# Patient Record
Sex: MALE | Race: WHITE | NOT HISPANIC OR LATINO | Employment: OTHER | URBAN - METROPOLITAN AREA
[De-identification: names, ages, dates, MRNs, and addresses within clinical notes are randomized per-mention and may not be internally consistent; named-entity substitution may affect disease eponyms.]

---

## 2022-07-18 ENCOUNTER — OFFICE VISIT (OUTPATIENT)
Dept: INTERNAL MEDICINE | Facility: CLINIC | Age: 82
End: 2022-07-18

## 2022-07-18 VITALS
HEART RATE: 63 BPM | HEIGHT: 73 IN | WEIGHT: 190.5 LBS | OXYGEN SATURATION: 98 % | DIASTOLIC BLOOD PRESSURE: 74 MMHG | BODY MASS INDEX: 25.25 KG/M2 | RESPIRATION RATE: 18 BRPM | SYSTOLIC BLOOD PRESSURE: 126 MMHG

## 2022-07-18 DIAGNOSIS — I79.0 ANEURYSM OF AORTA IN DISEASES CLASSIFIED ELSEWHERE: ICD-10-CM

## 2022-07-18 DIAGNOSIS — I71.9 AORTIC ANEURYSM WITHOUT RUPTURE, UNSPECIFIED PORTION OF AORTA: Primary | ICD-10-CM

## 2022-07-18 DIAGNOSIS — I48.91 ATRIAL FIBRILLATION, UNSPECIFIED TYPE: ICD-10-CM

## 2022-07-18 PROCEDURE — 99204 OFFICE O/P NEW MOD 45 MIN: CPT | Performed by: FAMILY MEDICINE

## 2022-07-18 RX ORDER — TRAZODONE HYDROCHLORIDE 100 MG/1
100 TABLET ORAL NIGHTLY
Qty: 90 TABLET | Refills: 3 | Status: SHIPPED | OUTPATIENT
Start: 2022-07-18

## 2022-07-18 RX ORDER — AMIODARONE HYDROCHLORIDE 200 MG/1
100 TABLET ORAL DAILY
COMMUNITY
End: 2022-07-18

## 2022-07-18 RX ORDER — MONTELUKAST SODIUM 10 MG/1
10 TABLET ORAL NIGHTLY
Qty: 90 TABLET | Refills: 3 | Status: SHIPPED | OUTPATIENT
Start: 2022-07-18

## 2022-07-18 RX ORDER — MONTELUKAST SODIUM 10 MG/1
TABLET ORAL
COMMUNITY
Start: 2022-07-11 | End: 2022-07-18 | Stop reason: SDUPTHER

## 2022-07-18 RX ORDER — SPIRONOLACTONE 50 MG/1
TABLET, FILM COATED ORAL
COMMUNITY
Start: 2022-07-11 | End: 2022-07-18 | Stop reason: SDUPTHER

## 2022-07-18 RX ORDER — SPIRONOLACTONE 50 MG/1
50 TABLET, FILM COATED ORAL DAILY
Qty: 90 TABLET | Refills: 3 | Status: SHIPPED | OUTPATIENT
Start: 2022-07-18

## 2022-07-18 RX ORDER — AMIODARONE HYDROCHLORIDE 100 MG/1
100 TABLET ORAL DAILY
Qty: 90 TABLET | Refills: 3 | Status: SHIPPED | OUTPATIENT
Start: 2022-07-18 | End: 2022-10-17

## 2022-07-18 RX ORDER — TRAZODONE HYDROCHLORIDE 100 MG/1
TABLET ORAL
COMMUNITY
Start: 2022-07-11 | End: 2022-07-18 | Stop reason: SDUPTHER

## 2022-07-24 NOTE — PROGRESS NOTES
"Chief Complaint  Establish Care    Subjective        Dat Menjivar presents to [facility]  History of Present Illness    Patient presents at today's office visit as a new patient.  Patient states that he has a history of having aortic aneurysm, but cannot give further details exactly where in the aorta this can be.  Normal I able to find and medical records of this.  He states that he is looking to see a cardiologist that he can see for this.  He currently lives in [place] and visits [place] every 3 months for 2 weeks at a time.  And would like to see his cardiologist at that time.  He also states that he typically gets an echocardiogram done as well as an ultrasound of the aorta done regularly for this, I did discuss at today's of visit that we can order these.    He does have a history having A. fib, and is currently taking Xarelto 20 mg daily for this along with amiodarone 100 mg daily.    Objective   Vital Signs:  /74   Pulse 63   Resp 18   Ht 185.4 cm (73\")   Wt 86.4 kg (190 lb 8 oz)   SpO2 98%   BMI 25.13 kg/m²   Estimated body mass index is 25.13 kg/m² as calculated from the following:    Height as of this encounter: 185.4 cm (73\").    Weight as of this encounter: 86.4 kg (190 lb 8 oz).          Physical Exam  Vitals and nursing note reviewed.   Constitutional:       Appearance: He is well-developed.   HENT:      Head: Normocephalic and atraumatic.   Musculoskeletal:      Cervical back: Normal range of motion and neck supple.   Neurological:      Mental Status: He is alert and oriented to person, place, and time.   Psychiatric:         Behavior: Behavior normal.        Result Review :                Assessment and Plan   Diagnoses and all orders for this visit:    1. Aortic aneurysm without rupture, unspecified portion of aorta (HCC) (Primary)  -     Ambulatory Referral to Cardiology  -     Adult Transthoracic Echo Complete W/ Cont if Necessary Per " Protocol  -     US aorta limited    2. Atrial fibrillation, unspecified type (HCC)  -     Adult Transthoracic Echo Complete W/ Cont if Necessary Per Protocol    3. Aneurysm of aorta in diseases classified elsewhere (HCC)   -     Adult Transthoracic Echo Complete W/ Cont if Necessary Per Protocol    Other orders  -     rivaroxaban (XARELTO) 20 MG tablet; Take 1 tablet by mouth Daily.  Dispense: 90 tablet; Refill: 3  -     amiodarone (Pacerone) 100 MG tablet; Take 1 tablet by mouth Daily.  Dispense: 90 tablet; Refill: 3  -     traZODone (DESYREL) 100 MG tablet; Take 1 tablet by mouth Every Night.  Dispense: 90 tablet; Refill: 3  -     spironolactone (ALDACTONE) 50 MG tablet; Take 1 tablet by mouth Daily.  Dispense: 90 tablet; Refill: 3  -     montelukast (SINGULAIR) 10 MG tablet; Take 1 tablet by mouth Every Night.  Dispense: 90 tablet; Refill: 3      For his A. fib, continue Xarelto and amiodarone.  For his aortic aneurysm, we will get an ultrasound of the aorta as well as referral to cardiology.  As well as an echocardiogram.       Follow Up   No follow-ups on file.  Patient was given instructions and counseling regarding his condition or for health maintenance advice. Please see specific information pulled into the AVS if appropriate.

## 2022-07-28 ENCOUNTER — PATIENT ROUNDING (BHMG ONLY) (OUTPATIENT)
Dept: INTERNAL MEDICINE | Facility: CLINIC | Age: 82
End: 2022-07-28

## 2022-07-28 NOTE — PROGRESS NOTES
July 28, 2022    Hello, may I speak with Dat Menjivar?    My name is SONIA  I am  with [facility]  [address]  [place] [address] [phone].    Before we get started may I verify your date of birth? 1940    I am calling to officially welcome you to our practice and ask about your recent visit. Is this a good time to talk? YES  Tell me about your visit with us. What things went well? IT WAS GOOD       We're always looking for ways to make our patients' experiences even better. Do you have recommendations on ways we may improve?  NO   Overall were you satisfied with your first visit to our practice? YES     I appreciate you taking the time to speak with me today. Is there anything else I can do for you? NO    Thank you, and have a great day.

## 2022-10-17 RX ORDER — AMIODARONE HYDROCHLORIDE 100 MG/1
100 TABLET ORAL DAILY
Qty: 90 TABLET | Refills: 0 | Status: SHIPPED | OUTPATIENT
Start: 2022-10-17

## 2023-01-05 ENCOUNTER — TELEPHONE (OUTPATIENT)
Dept: INTERNAL MEDICINE | Facility: CLINIC | Age: 83
End: 2023-01-05

## 2023-01-05 NOTE — TELEPHONE ENCOUNTER
Caller: Dat Menjivar    Relationship: Self    Best call back number: [phone]    What is the medical concern/diagnosis: SINUS ISSUES     What specialty or service is being requested: EAR NOSE AND THROAT    What is the provider, practice or medical service name: WHOEVER IS RECOMMENDED.     What is the office location: [place]    PLEASE REACH SON TO SCHEDULE [phone] AS DAT LIVES IN [facility] AND WILL NOT BE IN THE [facility] UNTIL January 19TH-24TH

## 2023-01-20 ENCOUNTER — OFFICE VISIT (OUTPATIENT)
Dept: INTERNAL MEDICINE | Facility: CLINIC | Age: 83
End: 2023-01-20
Payer: MEDICARE

## 2023-01-20 VITALS
OXYGEN SATURATION: 98 % | HEIGHT: 73 IN | BODY MASS INDEX: 25.84 KG/M2 | DIASTOLIC BLOOD PRESSURE: 82 MMHG | WEIGHT: 195 LBS | HEART RATE: 64 BPM | SYSTOLIC BLOOD PRESSURE: 116 MMHG

## 2023-01-20 DIAGNOSIS — H69.83 EUSTACHIAN TUBE DYSFUNCTION, BILATERAL: ICD-10-CM

## 2023-01-20 DIAGNOSIS — H91.93 DECREASED HEARING OF BOTH EARS: Primary | ICD-10-CM

## 2023-01-20 PROCEDURE — 99214 OFFICE O/P EST MOD 30 MIN: CPT | Performed by: FAMILY MEDICINE

## 2023-01-20 RX ORDER — FLUTICASONE PROPIONATE 50 MCG
2 SPRAY, SUSPENSION (ML) NASAL DAILY
Qty: 18 ML | Refills: 6 | Status: SHIPPED | OUTPATIENT
Start: 2023-01-20

## 2023-01-22 NOTE — PROGRESS NOTES
"Chief Complaint  ent referral    Subjective        Dat Menjivar presents to [facility]  History of Present Illness    Patient has some what he believes may be eustachian tube dysfunction.  Patient states that he has some trouble hearing.  However he feels that this pressure inside his sinuses.  He is currently using Singulair along with proximal over-the-counter allergy medication.  He has not tried a nasal steroid.  He has used saline rinses.    Objective   Vital Signs:  /82 (BP Location: Left arm, Patient Position: Sitting, Cuff Size: Adult)   Pulse 64   Ht 185 cm (72.84\")   Wt 88.5 kg (195 lb)   SpO2 98%   BMI 25.84 kg/m²   Estimated body mass index is 25.84 kg/m² as calculated from the following:    Height as of this encounter: 185 cm (72.84\").    Weight as of this encounter: 88.5 kg (195 lb).             Physical Exam  Vitals and nursing note reviewed.   Constitutional:       Appearance: He is well-developed.   HENT:      Head: Normocephalic and atraumatic.   Musculoskeletal:      Cervical back: Normal range of motion and neck supple.   Neurological:      Mental Status: He is alert and oriented to person, place, and time.   Psychiatric:         Behavior: Behavior normal.        Result Review :                   Assessment and Plan   Diagnoses and all orders for this visit:    1. Decreased hearing of both ears (Primary)  -     Ambulatory Referral to Audiology    2. Eustachian tube dysfunction, bilateral  -     fluticasone (FLONASE) 50 MCG/ACT nasal spray; 2 sprays into the nostril(s) as directed by provider Daily.  Dispense: 18 mL; Refill: 6  -     Ambulatory Referral to ENT (Otolaryngology)      I discussed with him that he could benefit from seeing ENT.  I do recommend him starting Flonase.  He may also benefit from seeing audiology for hearing testing.       Follow Up   No follow-ups on file.  Patient was given instructions and counseling regarding his condition " or for health maintenance advice. Please see specific information pulled into the AVS if appropriate.

## 2023-06-06 ENCOUNTER — TELEPHONE (OUTPATIENT)
Dept: INTERNAL MEDICINE | Facility: CLINIC | Age: 83
End: 2023-06-06

## 2023-06-06 NOTE — TELEPHONE ENCOUNTER
Caller: Dat Menjivar    Relationship: Self    Best call back number: [phone]     What is the medical concern/diagnosis: PATIENT WILLL BE IN FROM [facility] ON 7-9-23 AND LEAVING 7-16-23    HE WANTS TO BE REFERRED TO CARDIOLOGY   HE WANTS TO BE REFERRED TO UROLOGY           Any additional details: HE WANTS TO HAVE THE APPOINTS WITH THESE SPECIALTY CLINICS WHILE HE IS IN BETWEEN JULY 9 AND JULY 16.    HE HAS A PRIMARY CARE APPOINTMENT SCHEDULED FOR  JULY 11TH.    IF HE NEEDS LABS PRIOR TO THIS APPOINTMENT PLEASE CALL TO [facility] AND LEAVE MESSAGE ON ABOVE PHONE WHICH IS THE SONS NUMBER.

## 2023-06-07 DIAGNOSIS — R31.9 HEMATURIA, UNSPECIFIED TYPE: ICD-10-CM

## 2023-06-07 DIAGNOSIS — I71.9 AORTIC ANEURYSM WITHOUT RUPTURE, UNSPECIFIED PORTION OF AORTA: Primary | ICD-10-CM

## 2023-06-07 DIAGNOSIS — R94.31 ABNORMAL ELECTROCARDIOGRAM (ECG) (EKG): ICD-10-CM

## 2023-06-07 NOTE — TELEPHONE ENCOUNTER
Pt was referred to cardiology in 2022 but refused service.  I need a call back to ask the reason he needs to see urology.  LVM.

## 2023-06-07 NOTE — TELEPHONE ENCOUNTER
Caller: Dat Menjivar    Relationship: Self    Best call back number: [phone]    What was the call regarding: PATIENT STATES THAT THE REFERRAL FOR A CARDIOLOGIST IS NEEDED BECAUSE HE NEEDS AN ULTRASOUND OF HIS HEART.    REFERRAL FOR UROLOGY IS NEEDED BECAUSE EVERY 4-5 MONTHS HE NOTICES SOME BLOOD IN HIS URINE.

## 2023-07-11 ENCOUNTER — OFFICE VISIT (OUTPATIENT)
Dept: INTERNAL MEDICINE | Facility: CLINIC | Age: 83
End: 2023-07-11
Payer: MEDICARE

## 2023-07-11 VITALS
HEART RATE: 92 BPM | RESPIRATION RATE: 16 BRPM | WEIGHT: 193 LBS | OXYGEN SATURATION: 96 % | SYSTOLIC BLOOD PRESSURE: 98 MMHG | BODY MASS INDEX: 25.58 KG/M2 | DIASTOLIC BLOOD PRESSURE: 66 MMHG | HEIGHT: 73 IN

## 2023-07-11 DIAGNOSIS — F51.01 PRIMARY INSOMNIA: ICD-10-CM

## 2023-07-11 DIAGNOSIS — Z00.00 HEALTHCARE MAINTENANCE: ICD-10-CM

## 2023-07-11 DIAGNOSIS — Z00.00 MEDICARE ANNUAL WELLNESS VISIT, INITIAL: ICD-10-CM

## 2023-07-11 DIAGNOSIS — Z00.00 VISIT FOR WELL MAN HEALTH CHECK: Primary | ICD-10-CM

## 2023-07-11 DIAGNOSIS — J30.9 ALLERGIC RHINITIS, UNSPECIFIED SEASONALITY, UNSPECIFIED TRIGGER: ICD-10-CM

## 2023-07-11 RX ORDER — MONTELUKAST SODIUM 10 MG/1
10 TABLET ORAL NIGHTLY
Qty: 90 TABLET | Refills: 1 | Status: SHIPPED | OUTPATIENT
Start: 2023-07-11

## 2023-07-11 RX ORDER — ZOLPIDEM TARTRATE 10 MG/1
10 TABLET ORAL NIGHTLY PRN
Qty: 90 TABLET | Refills: 0 | Status: SHIPPED | OUTPATIENT
Start: 2023-07-11

## 2023-07-11 NOTE — PROGRESS NOTES
The ABCs of the Annual Wellness Visit  Initial Medicare Wellness Visit    Subjective     Dat Menjivar is a 83 y.o. male who presents for an Initial Medicare Wellness Visit.    The following portions of the patient's history were reviewed and   updated as appropriate: allergies, current medications, past family history, past medical history, past social history, past surgical history, and problem list.     Compared to one year ago, the patient feels his physical   health is better.    Compared to one year ago, the patient feels his mental   health is better.    Recent Hospitalizations:  He was not admitted to the hospital during the last year.       Current Medical Providers:  Patient Care Team:  Flaco Wylie MD as PCP - General (Family Medicine)    Outpatient Medications Prior to Visit   Medication Sig Dispense Refill    traZODone (DESYREL) 100 MG tablet Take 1 tablet by mouth Every Night. 90 tablet 3    amiodarone (PACERONE) 100 MG tablet TAKE 1 TABLET BY MOUTH DAILY 90 tablet 0    fluticasone (FLONASE) 50 MCG/ACT nasal spray 2 sprays into the nostril(s) as directed by provider Daily. (Patient not taking: Reported on 7/14/2023) 18 mL 6    montelukast (SINGULAIR) 10 MG tablet Take 1 tablet by mouth Every Night. 90 tablet 3    rivaroxaban (XARELTO) 20 MG tablet Take 1 tablet by mouth Daily. 90 tablet 3    spironolactone (ALDACTONE) 50 MG tablet Take 1 tablet by mouth Daily. 90 tablet 3     No facility-administered medications prior to visit.       No opioid medication identified on active medication list. I have reviewed chart for other potential  high risk medication/s and harmful drug interactions in the elderly.        Aspirin is not on active medication list.  Aspirin use is not indicated based on review of current medical condition/s. Risk of harm outweighs potential benefits.  .    There is no problem list on file for this patient.    Advance Care Planning   Advance Care Planning     Advance Directive  "is not on file.  ACP discussion was held with the patient during this visit. Patient does not have an advance directive, declines further assistance.       Objective    Vitals:    23 1242   BP: 98/66   BP Location: Left arm   Patient Position: Sitting   Cuff Size: Adult   Pulse: 92   Resp: 16   SpO2: 96%   Weight: 87.5 kg (193 lb)   Height: 185 cm (72.84\")     Estimated body mass index is 25.58 kg/mý as calculated from the following:    Height as of this encounter: 185 cm (72.84\").    Weight as of this encounter: 87.5 kg (193 lb).    BMI is >= 25 and <30. (Overweight) The following options were offered after discussion;: exercise counseling/recommendations and nutrition counseling/recommendations      Does the patient have evidence of cognitive impairment?   No    Lab Results   Component Value Date    TRIG 73 2023    HDL 46 2023    LDL 99 2023    VLDL 14 2023    HGBA1C 5.40 2023        HEALTH RISK ASSESSMENT    Smoking Status:  Social History     Tobacco Use   Smoking Status Former    Passive exposure: Past   Smokeless Tobacco Never   Tobacco Comments    Caffeine: 2 coffee daily     Alcohol Consumption:  Social History     Substance and Sexual Activity   Alcohol Use Not Currently    Alcohol/week: 8.0 - 9.0 standard drinks    Types: 3 Glasses of wine, 3 Shots of liquor, 2 - 3 Drinks containing 0.5 oz of alcohol per week    Comment: occ     Fall Risk Screen:    FLORENTINO Fall Risk Assessment was completed, and patient is at LOW risk for falls.Assessment completed on:2023    Depression Screen:       2023    12:46 PM   PHQ-2/PHQ-9 Depression Screening   Little Interest or Pleasure in Doing Things 0-->not at all   Feeling Down, Depressed or Hopeless 0-->not at all   PHQ-9: Brief Depression Severity Measure Score 0       Health Habits and Functional and Cognitive Screenin/11/2023    12:00 PM   Functional & Cognitive Status   Do you have difficulty preparing food and " eating? No   Do you have difficulty bathing yourself, getting dressed or grooming yourself? No   Do you have difficulty using the toilet? No   Do you have difficulty moving around from place to place? No   Do you have trouble with steps or getting out of a bed or a chair? No   Current Diet Well Balanced Diet   Dental Exam Up to date   Eye Exam Up to date   Exercise (times per week) 5 times per week   Current Exercises Include Walking   Do you need help using the phone?  No   Are you deaf or do you have serious difficulty hearing?  No   Do you need help to go to places out of walking distance? No   Do you need help shopping? No   Do you need help preparing meals?  No   Do you need help with housework?  No   Do you need help with laundry? No   Do you need help taking your medications? No   Do you need help managing money? No   Do you ever drive or ride in a car without wearing a seat belt? No   Have you felt unusual stress, anger or loneliness in the last month? No   Who do you live with? Alone   If you need help, do you have trouble finding someone available to you? No   Have you been bothered in the last four weeks by sexual problems? No   Do you have difficulty concentrating, remembering or making decisions? No       Age-appropriate Screening Schedule:  Refer to the list below for future screening recommendations based on patient's age, sex and/or medical conditions. Orders for these recommended tests are listed in the plan section. The patient has been provided with a written plan.    Health Maintenance   Topic Date Due    TDAP/TD VACCINES (1 - Tdap) Never done    ZOSTER VACCINE (1 of 2) Never done    Pneumococcal Vaccine 65+ (1 - PCV) Never done    COVID-19 Vaccine (2 - Pfizer series) 03/12/2022    ANNUAL WELLNESS VISIT  Never done    INFLUENZA VACCINE  10/01/2023          CMS Preventative Services Quick Reference  Risk Factors Identified During Encounter    None Identified    The above risks/problems have been  discussed with the patient.  Pertinent information has been shared with the patient in the After Visit Summary.  An After Visit Summary and PPPS were made available to the patient.  Diagnoses and all orders for this visit:    1. Visit for well man health check (Primary)    2. Medicare annual wellness visit, initial    3. Healthcare maintenance  -     CBC & Differential  -     Comprehensive Metabolic Panel  -     Hemoglobin A1c  -     Thyroid Panel With TSH  -     Lipid Panel With LDL / HDL Ratio    4. Allergic rhinitis, unspecified seasonality, unspecified trigger  -     montelukast (Singulair) 10 MG tablet; Take 1 tablet by mouth Every Night.  Dispense: 90 tablet; Refill: 1    5. Primary insomnia  -     zolpidem (AMBIEN) 10 MG tablet; Take 1 tablet by mouth At Night As Needed for Sleep.  Dispense: 90 tablet; Refill: 0      Follow Up:  Next Medicare Wellness visit to be scheduled in 1 year.

## 2023-07-13 ENCOUNTER — TELEPHONE (OUTPATIENT)
Dept: INTERNAL MEDICINE | Facility: CLINIC | Age: 83
End: 2023-07-13
Payer: MEDICARE

## 2023-07-13 NOTE — TELEPHONE ENCOUNTER
Caller: Dat Menjivar    Relationship: Self    Best call back number: [phone]    What orders are you requesting (i.e. lab or imaging): PSA, TESTOSTERONE, 12 PANEL BLOOD DRAW OR WHATEVER DR. ORLANDO DEEMS NECESSARY. PATIENT HAS NOT HAD LEVELS CHECKED IN A WHILE     In what timeframe would the patient need to come in: 07/14 OR 07/15    Where will you receive your lab/imaging services: [facility]     Additional notes: PATIENT STATES THAT HE IS LEAVING SUNDAY FOR [facility]. PATIENT HAS AN APPOINTMENT TOMORROW AT 9:30 WITH HIS CARDIOLOGIST. PATIENT WOULD BE GOOD TO COME IN FROM 10:30     PLEASE CALL PATIENT.

## 2023-07-14 LAB
ALBUMIN SERPL-MCNC: 4 G/DL (ref 3.5–5.2)
ALBUMIN/GLOB SERPL: 1.7 G/DL
ALP SERPL-CCNC: 55 U/L (ref 39–117)
ALT SERPL W P-5'-P-CCNC: 18 U/L (ref 1–41)
ANION GAP SERPL CALCULATED.3IONS-SCNC: 7 MMOL/L (ref 5–15)
AST SERPL-CCNC: 24 U/L (ref 1–40)
BASOPHILS # BLD AUTO: 0.04 10*3/MM3 (ref 0–0.2)
BASOPHILS NFR BLD AUTO: 0.6 % (ref 0–1.5)
BILIRUB SERPL-MCNC: 0.6 MG/DL (ref 0–1.2)
BUN SERPL-MCNC: 22 MG/DL (ref 8–23)
BUN/CREAT SERPL: 13.1 (ref 7–25)
CALCIUM SPEC-SCNC: 9.2 MG/DL (ref 8.6–10.5)
CHLORIDE SERPL-SCNC: 111 MMOL/L (ref 98–107)
CHOLEST SERPL-MCNC: 159 MG/DL (ref 0–200)
CO2 SERPL-SCNC: 24 MMOL/L (ref 22–29)
CREAT SERPL-MCNC: 1.68 MG/DL (ref 0.76–1.27)
DEPRECATED RDW RBC AUTO: 41.9 FL (ref 37–54)
EGFRCR SERPLBLD CKD-EPI 2021: 40.6 ML/MIN/1.73
EOSINOPHIL # BLD AUTO: 0.19 10*3/MM3 (ref 0–0.4)
EOSINOPHIL NFR BLD AUTO: 2.9 % (ref 0.3–6.2)
ERYTHROCYTE [DISTWIDTH] IN BLOOD BY AUTOMATED COUNT: 11.8 % (ref 12.3–15.4)
GLOBULIN UR ELPH-MCNC: 2.4 GM/DL
GLUCOSE SERPL-MCNC: 101 MG/DL (ref 65–99)
HBA1C MFR BLD: 5.4 % (ref 4.8–5.6)
HCT VFR BLD AUTO: 52.7 % (ref 37.5–51)
HDLC SERPL-MCNC: 46 MG/DL (ref 40–60)
HGB BLD-MCNC: 17.7 G/DL (ref 13–17.7)
IMM GRANULOCYTES # BLD AUTO: 0.03 10*3/MM3 (ref 0–0.05)
IMM GRANULOCYTES NFR BLD AUTO: 0.5 % (ref 0–0.5)
LDLC SERPL CALC-MCNC: 99 MG/DL (ref 0–100)
LDLC/HDLC SERPL: 2.14 {RATIO}
LYMPHOCYTES # BLD AUTO: 1.45 10*3/MM3 (ref 0.7–3.1)
LYMPHOCYTES NFR BLD AUTO: 22.4 % (ref 19.6–45.3)
MCH RBC QN AUTO: 32.3 PG (ref 26.6–33)
MCHC RBC AUTO-ENTMCNC: 33.6 G/DL (ref 31.5–35.7)
MCV RBC AUTO: 96.2 FL (ref 79–97)
MONOCYTES # BLD AUTO: 0.49 10*3/MM3 (ref 0.1–0.9)
MONOCYTES NFR BLD AUTO: 7.6 % (ref 5–12)
NEUTROPHILS NFR BLD AUTO: 4.27 10*3/MM3 (ref 1.7–7)
NEUTROPHILS NFR BLD AUTO: 66 % (ref 42.7–76)
NRBC BLD AUTO-RTO: 0 /100 WBC (ref 0–0.2)
PLATELET # BLD AUTO: 148 10*3/MM3 (ref 140–450)
PMV BLD AUTO: 10 FL (ref 6–12)
POTASSIUM SERPL-SCNC: 4.6 MMOL/L (ref 3.5–5.2)
PROT SERPL-MCNC: 6.4 G/DL (ref 6–8.5)
RBC # BLD AUTO: 5.48 10*6/MM3 (ref 4.14–5.8)
SODIUM SERPL-SCNC: 142 MMOL/L (ref 136–145)
T-UPTAKE NFR SERPL: 0.89 TBI (ref 0.8–1.3)
T4 SERPL-MCNC: 6.17 MCG/DL (ref 4.5–11.7)
TRIGL SERPL-MCNC: 73 MG/DL (ref 0–150)
TSH SERPL DL<=0.05 MIU/L-ACNC: 1.56 UIU/ML (ref 0.27–4.2)
VLDLC SERPL-MCNC: 14 MG/DL (ref 5–40)
WBC NRBC COR # BLD: 6.47 10*3/MM3 (ref 3.4–10.8)

## 2023-07-14 PROCEDURE — 36415 COLL VENOUS BLD VENIPUNCTURE: CPT | Performed by: FAMILY MEDICINE

## 2023-07-14 NOTE — TELEPHONE ENCOUNTER
PATIENT CALLING BACK AND REQUESTING TO HAVE HIS LABS DRAWN TODAY.    PATIENT WOULD LIKE TO HAVE THE PSA AND TESTOSTERONE ADDED TO HAVE DRAWN TODAY AS WELL.  PATIENT IS PLANNING ON HAVING IT DONE AROUND 10:30 THIS MORNING.

## 2023-07-19 NOTE — PROGRESS NOTES
Please inform the patient of the following abnormal results. Elevated serum creatnine. Needs to drink of plenty of water. Will recheck at next visit. [place] of country, but comes every 4 mos.

## 2023-07-21 ENCOUNTER — TELEPHONE (OUTPATIENT)
Dept: INTERNAL MEDICINE | Facility: CLINIC | Age: 83
End: 2023-07-21

## 2023-07-21 NOTE — TELEPHONE ENCOUNTER
[facility] staff attempted to follow warm transfer process and was unsuccessful     Caller: Dat Menjivar    Relationship to patient: Self    Best call back number:     Patient is needing: PATIENT IS RETURNING A CALL TO GIOVANNA AND STATES HE ALSO NEEDS TO SPEAK WITH SONIA.   PATIENT WANTS TO TELL GIOVANNA ABOUT SEEING DR. LEUNG AND SOME THINGS RELATED TO THAT, AS WELL AS GETTING SOME PROCEDURES DONE IN [place] [facility] THAN IN [facility].        PATIENT ALSO WANTS A REFERRAL TO DERMATOLOGY.       [facility] WAS UNABLE TO WARM TRANSFER THE CALL.

## 2023-07-21 NOTE — TELEPHONE ENCOUNTER
"Pt states he will be in town from [place] on Monday and requesting a referral for dermatology \"due to several small skin cancers from living in [place]\". He said he will only be in town for the week for medical appts.     Please advise, thanks.    Thanks-  Vahe"

## 2023-07-23 NOTE — TELEPHONE ENCOUNTER
Its to late to get the labs added on. I have ordered it to be done next time he is getting labs done.

## 2023-07-23 NOTE — TELEPHONE ENCOUNTER
Can we have these referrals put in. I believe he was already here for a week. Is this another week he is coming in for?

## 2023-07-25 NOTE — TELEPHONE ENCOUNTER
He called last Friday and requested this, and then I sent you the message. He stated that he will be in town this week.

## 2023-07-28 ENCOUNTER — HOSPITAL ENCOUNTER (OUTPATIENT)
Dept: CT IMAGING | Facility: HOSPITAL | Age: 83
Discharge: HOME | End: 2023-07-28
Admitting: INTERNAL MEDICINE
Payer: MEDICARE

## 2023-07-28 LAB — CREAT BLDA-MCNC: 1.6 MG/DL (ref 0.6–1.3)

## 2023-07-28 PROCEDURE — 25510000001 IOPAMIDOL PER 1 ML: Performed by: INTERNAL MEDICINE

## 2023-07-28 PROCEDURE — 82565 ASSAY OF CREATININE: CPT

## 2023-07-28 PROCEDURE — 71275 CT ANGIOGRAPHY CHEST: CPT

## 2023-07-28 RX ADMIN — IOPAMIDOL 95 ML: 755 INJECTION, SOLUTION INTRAVENOUS at 12:42

## 2023-08-02 RX ORDER — METOPROLOL SUCCINATE 25 MG/1
25 TABLET, EXTENDED RELEASE ORAL DAILY
Qty: 90 TABLET | Refills: 3 | Status: SHIPPED | OUTPATIENT
Start: 2023-08-02

## 2023-08-12 NOTE — PROGRESS NOTES
Subjective   Dat Menjivar is a 83 y.o. male and is here for a comprehensive physical exam. The patient reports no problems.            Social History:   Social History     Socioeconomic History    Marital status: Single    Number of children: 1   Tobacco Use    Smoking status: Former     Passive exposure: Past    Smokeless tobacco: Never    Tobacco comments:     Caffeine: 2 coffee daily   Vaping Use    Vaping Use: Never used   Substance and Sexual Activity    Alcohol use: Not Currently     Alcohol/week: 8.0 - 9.0 standard drinks     Types: 3 Glasses of wine, 3 Shots of liquor, 2 - 3 Drinks containing 0.5 oz of alcohol per week     Comment: occ    Drug use: Not Currently    Sexual activity: Not Currently     Partners: Female       Family History:   Family History   Problem Relation Age of Onset    Diabetes Sister        Past Medical History:   Past Medical History:   Diagnosis Date    Clotting disorder     Depression     HL (hearing loss)        The following portions of the patient's history were reviewed and updated as appropriate: allergies, current medications, past family history, past medical history, past social history, past surgical history and problem list.    Review of Systems    Review of Systems   Constitutional:  Negative for chills and fever.   HENT:  Negative for congestion, rhinorrhea, sinus pain and sore throat.    Eyes:  Negative for photophobia and visual disturbance.   Respiratory:  Negative for cough, chest tightness and shortness of breath.    Cardiovascular:  Negative for chest pain and palpitations.   Gastrointestinal:  Negative for diarrhea, nausea and vomiting.   Genitourinary:  Negative for dysuria, frequency and urgency.   Skin:  Negative for rash and wound.   Neurological:  Negative for dizziness and syncope.   Psychiatric/Behavioral:  Negative for behavioral problems and confusion.      Objective   Physical Exam  Vitals and nursing note reviewed.   Constitutional:       Appearance:  He is well-developed.   HENT:      Head: Normocephalic and atraumatic.      Right Ear: External ear normal.      Left Ear: External ear normal.   Cardiovascular:      Rate and Rhythm: Normal rate and regular rhythm.      Heart sounds: Normal heart sounds.   Pulmonary:      Effort: Pulmonary effort is normal. No respiratory distress.      Breath sounds: Normal breath sounds.   Abdominal:      Palpations: Abdomen is soft.      Tenderness: There is no abdominal tenderness. There is no guarding.   Musculoskeletal:         General: Normal range of motion.      Cervical back: Normal range of motion and neck supple.   Lymphadenopathy:      Cervical: No cervical adenopathy.   Skin:     General: Skin is warm.   Neurological:      Mental Status: He is alert and oriented to person, place, and time.   Psychiatric:         Behavior: Behavior normal.       Vitals:    07/11/23 1242   BP: 98/66   Pulse: 92   Resp: 16   SpO2: 96%     Body mass index is 25.58 kg/mý.    Medications:   Current Outpatient Medications:     traZODone (DESYREL) 100 MG tablet, Take 1 tablet by mouth Every Night., Disp: 90 tablet, Rfl: 3    metoprolol succinate XL (TOPROL-XL) 25 MG 24 hr tablet, Take 1 tablet by mouth Daily., Disp: 90 tablet, Rfl: 3    montelukast (Singulair) 10 MG tablet, Take 1 tablet by mouth Every Night., Disp: 90 tablet, Rfl: 1    rivaroxaban (XARELTO) 20 MG tablet, Take 1 tablet by mouth Daily., Disp: 90 tablet, Rfl: 1    spironolactone (ALDACTONE) 50 MG tablet, Take 1 tablet by mouth Daily., Disp: 90 tablet, Rfl: 3    zolpidem (AMBIEN) 10 MG tablet, Take 1 tablet by mouth At Night As Needed for Sleep., Disp: 90 tablet, Rfl: 0       Assessment & Plan   Healthy male exam.      1. Healthcare Maintenance:  2. Patient Counseling:  --Nutrition: Stressed importance of moderation in sodium/caffeine intake, saturated fat and cholesterol, caloric balance, sufficient intake of fresh fruits, vegetables, fiber, calcium and vit D  --Exercise:  Recommended 30 minutes of exercise daily.   --Immunizations reviewed.  --Discussed benefits of screening colonoscopy.    Diagnoses and all orders for this visit:    Visit for well man health check    Medicare annual wellness visit, initial    Healthcare maintenance  -     CBC & Differential  -     Comprehensive Metabolic Panel  -     Hemoglobin A1c  -     Thyroid Panel With TSH  -     Lipid Panel With LDL / HDL Ratio    Allergic rhinitis, unspecified seasonality, unspecified trigger  -     montelukast (Singulair) 10 MG tablet; Take 1 tablet by mouth Every Night.    Primary insomnia  -     zolpidem (AMBIEN) 10 MG tablet; Take 1 tablet by mouth At Night As Needed for Sleep.        No follow-ups on file.           Dictated utilizing Dragon Voice Recognition Software

## 2023-08-12 NOTE — PROGRESS NOTES
"Chief Complaint  Medicare Wellness-subsequent    Subjective        Dat Menjivar presents to [facility]  History of Present Illness    Patient is a history of having some allergic rhinitis.  Patient states that he would like to try Singulair.  He has been taking Singulair, but has run out of it.  Patient would like to get back on his Singulair 10 mg daily.    Patient states that he has some trouble sleeping.  Patient states that he would like to try some medicine to help him sleep better.    Objective   Vital Signs:  BP 98/66 (BP Location: Left arm, Patient Position: Sitting, Cuff Size: Adult)   Pulse 92   Resp 16   Ht 185 cm (72.84\")   Wt 87.5 kg (193 lb)   SpO2 96%   BMI 25.58 kg/mý   Estimated body mass index is 25.58 kg/mý as calculated from the following:    Height as of this encounter: 185 cm (72.84\").    Weight as of this encounter: 87.5 kg (193 lb).             Physical Exam   Result Review :                   Assessment and Plan   Diagnoses and all orders for this visit:            Healthcare maintenance  -     CBC & Differential  -     Comprehensive Metabolic Panel  -     Hemoglobin A1c  -     Thyroid Panel With TSH  -     Lipid Panel With LDL / HDL Ratio    Allergic rhinitis, unspecified seasonality, unspecified trigger  -     montelukast (Singulair) 10 MG tablet; Take 1 tablet by mouth Every Night.  Dispense: 90 tablet; Refill: 1    Primary insomnia  -     zolpidem (AMBIEN) 10 MG tablet; Take 1 tablet by mouth At Night As Needed for Sleep.  Dispense: 90 tablet; Refill: 0    For his allergic rhinitis, we will continue him on singular 10 mg daily.  I did advise him to use some Claritin and Flonase as well.  For his primary insomnia, we will start him on Ambien 10 mg nightly.         Follow Up   No follow-ups on file.  Patient was given instructions and counseling regarding his condition or for health maintenance advice. Please see specific information pulled into the " AVS if appropriate.

## 2023-08-14 DIAGNOSIS — E27.8 ADRENAL NODULE: ICD-10-CM

## 2023-08-14 DIAGNOSIS — R91.1 LUNG NODULE: Primary | ICD-10-CM

## 2023-09-05 RX ORDER — TRAZODONE HYDROCHLORIDE 100 MG/1
100 TABLET ORAL NIGHTLY
Qty: 90 TABLET | Refills: 0 | Status: SHIPPED | OUTPATIENT
Start: 2023-09-05

## 2023-09-05 RX ORDER — AMIODARONE HYDROCHLORIDE 100 MG/1
TABLET ORAL
Qty: 90 TABLET | Refills: 0 | Status: SHIPPED | OUTPATIENT
Start: 2023-09-05

## 2023-09-05 NOTE — TELEPHONE ENCOUNTER
Rx Refill Note  Requested Prescriptions     Pending Prescriptions Disp Refills    traZODone (DESYREL) 100 MG tablet [Pharmacy Med Name: TRAZODONE 100 MG TABLET] 90 tablet 0     Sig: TAKE 1 TABLET BY MOUTH EVERY NIGHT    amiodarone (PACERONE) 100 MG tablet [Pharmacy Med Name: AMIODARONE  MG TABLET] 90 tablet 0     Sig: TAKE 1 TABLET BY MOUTH DAILY      Last office visit with prescribing clinician: 7/11/2023   Last telemedicine visit with prescribing clinician: Visit date not found   Next office visit with prescribing clinician: Visit date not found                         Would you like a call back once the refill request has been completed: [] Yes [] No    If the office needs to give you a call back, can they leave a voicemail: [] Yes [] No    Hailee Jim MA  09/05/23, 09:23 EDT

## 2023-10-20 ENCOUNTER — TELEPHONE (OUTPATIENT)
Dept: INTERNAL MEDICINE | Facility: CLINIC | Age: 83
End: 2023-10-20

## 2023-10-20 NOTE — TELEPHONE ENCOUNTER
Caller: Dat Menjivar    Relationship to patient: Self    Best call back number:  THIS IS THE NUMBER FOR MAYTE, PATIENT'S SON AND THE PATIENT AUTHORIZES THE OFFICE TO LEAVE THIS INFORMATION WITH HIS SON.        Patient is needing: PATIENT IS NEEDING A REFERRAL FOR WHEN HE COMES BACK TO THE [facility] FROM [facility] THE WEEK OF NOVEMBER 13TH, 2023.        PATIENT STATES THAT HE IS NEEDING A REFERRAL TO AN ORTHOPEDIST THAT DR. ORLANDO RECOMMENDS HE SEE FROM A PREVIOUSLY BROKEN WRIST THE END OF JULY FOR WHICH HE DID NOT HAVE SURGERY ON.    HE IS NOW HAVING RESIDUAL PAIN IN HIS HAND/WRIST NOW THAT THE CAST HAS BEEN REMOVED.       PATIENT STATES HE IS ALSO NEEDING TO FOLLOW UP WITH A UROLOGIST TO HAVE A CYSTOSCOPY AND ULTRASOUND OF HIS BLADDER DUE TO HAVING BLOOD IN HIS URINE.   PATIENT BELIEVES HE WAS SEEN PREVIOUSLY BY DR. MARLYN ALEGRIA AT [facility].         PLEASE CONTACT THE PATIENT'S SON TO PROVIDE HIM REFERRAL INFORMATION DETAILS.

## 2023-10-27 ENCOUNTER — TELEPHONE (OUTPATIENT)
Dept: INTERNAL MEDICINE | Facility: CLINIC | Age: 83
End: 2023-10-27
Payer: MEDICARE

## 2023-10-27 DIAGNOSIS — H90.3 SENSORINEURAL HEARING LOSS (SNHL) OF BOTH EARS: ICD-10-CM

## 2023-10-27 DIAGNOSIS — M25.532 WRIST PAIN, CHRONIC, LEFT: Primary | ICD-10-CM

## 2023-10-27 DIAGNOSIS — G89.29 WRIST PAIN, CHRONIC, LEFT: Primary | ICD-10-CM

## 2023-10-27 NOTE — TELEPHONE ENCOUNTER
Patient lives in [place] and will be coming into town 11/11/23 to 11/17/23 and is requesting referrals be placed beforehand so he can make appointments to be seen while in town, he is requesting Audiology referral for bilateral hearing loss (he currently wears hearing aids), he also requests a Orthopedic surgery referral for wrist pain-he has imaging done in [facility]. Patient has an appointment with Dr. Wylie on 11/17/23.

## 2023-11-13 ENCOUNTER — OFFICE VISIT (OUTPATIENT)
Dept: SPORTS MEDICINE | Facility: CLINIC | Age: 83
End: 2023-11-13
Payer: MEDICARE

## 2023-11-13 ENCOUNTER — PATIENT ROUNDING (BHMG ONLY) (OUTPATIENT)
Dept: SPORTS MEDICINE | Facility: CLINIC | Age: 83
End: 2023-11-13
Payer: MEDICARE

## 2023-11-13 VITALS
OXYGEN SATURATION: 98 % | HEIGHT: 72 IN | WEIGHT: 185 LBS | SYSTOLIC BLOOD PRESSURE: 100 MMHG | TEMPERATURE: 97.1 F | RESPIRATION RATE: 12 BRPM | HEART RATE: 57 BPM | DIASTOLIC BLOOD PRESSURE: 60 MMHG | BODY MASS INDEX: 25.06 KG/M2

## 2023-11-13 DIAGNOSIS — S52.502G DELAYED UNION OF DISTAL RADIUS FRACTURE, LEFT, CLOSED: Primary | Chronic | ICD-10-CM

## 2023-11-13 RX ORDER — TAMSULOSIN HYDROCHLORIDE 0.4 MG/1
1 CAPSULE ORAL DAILY
COMMUNITY

## 2023-11-13 NOTE — PROGRESS NOTES
"Chief Complaint  Pain of the Left Wrist (After a fall in September-broke it-put in cast for 6 weeks and than took another set of x-rays.  Doctor advised surgery to put pins in it-still has swelling and limited motion)    Subjective        Dat Menjivar presents to [facility]  History of Present Illness  Patient referred to us by PCP Flaco Wylie MD for l wrist fx. patient lives in [facility], on August 27, 2023 he had a fall at home suffering intra-articular and angulated distal left radius fracture, ulnar styloid fracture.  Patient was told he needed surgery but because he was leaving [facility] did come to visit family here in [place] he decided to be seen here if surgery was needed.  Patient is only in [place] for the next week.  Placed in a cast [facility] but cast was removed couple of weeks ago and patient is still having pain and swelling over the dorsal aspect of the wrist, has significant pain and loss of strength with .  Objective   Vital Signs:  /60 (BP Location: Right arm, Patient Position: Sitting, Cuff Size: Adult)   Pulse 57   Temp 97.1 °F (36.2 °C) (Infrared)   Resp 12   Ht 182.9 cm (72\")   Wt 83.9 kg (185 lb)   SpO2 98%   BMI 25.09 kg/m²   Estimated body mass index is 25.09 kg/m² as calculated from the following:    Height as of this encounter: 182.9 cm (72\").    Weight as of this encounter: 83.9 kg (185 lb).               Physical Exam  Vitals reviewed.   Constitutional:       Appearance: He is well-developed.   HENT:      Head: Normocephalic and atraumatic.   Eyes:      Conjunctiva/sclera: Conjunctivae normal.      Pupils: Pupils are equal, round, and reactive to light.   Cardiovascular:      Comments: No peripheral edema  Pulmonary:      Effort: Pulmonary effort is normal.   Musculoskeletal:      Comments: Patient has soft tissue swelling over the dorsal aspect of the wrist and hand, there is a mild ulnar angulation of the hand.  Patient " unable to oppose thumb and fifth finger.   Skin:     General: Skin is warm and dry.   Neurological:      Mental Status: He is alert and oriented to person, place, and time.   Psychiatric:         Behavior: Behavior normal.        Result Review :        I reviewed x-rays from [facility] dated 8/27/2023 and 9/10/2023.  Injury as described above but there also appears to be Andres Diego sign scaphoid lunate.           Assessment and Plan   Diagnoses and all orders for this visit:    1. Delayed union of distal radius fracture, left, closed (Primary)  -     CT wrist left wo contrast; Future  -     Ambulatory Referral to Hand Surgery      We will get patient referred to Ortho hand surgeon ASAP and CT scan of the wrist.  Will place in a cock-up wrist splint for now.     I spent 46 minutes caring for Dat on this date of service. This time includes time spent by me in the following activities:preparing for the visit, reviewing tests, obtaining and/or reviewing a separately obtained history, performing a medically appropriate examination and/or evaluation , counseling and educating the patient/family/caregiver, ordering medications, tests, or procedures, referring and communicating with other health care professionals , documenting information in the medical record, independently interpreting results and communicating that information with the patient/family/caregiver, and care coordination  Follow Up   No follow-ups on file.  Patient was given instructions and counseling regarding his condition or for health maintenance advice. Please see specific information pulled into the AVS if appropriate.

## 2023-11-13 NOTE — PROGRESS NOTES
November 13, 2023    A [facility] Message has been sent to the patient for PATIENT ROUNDING with [facility]

## 2023-11-16 ENCOUNTER — OFFICE VISIT (OUTPATIENT)
Dept: INTERNAL MEDICINE | Facility: CLINIC | Age: 83
End: 2023-11-16
Payer: MEDICARE

## 2023-11-16 VITALS
DIASTOLIC BLOOD PRESSURE: 70 MMHG | RESPIRATION RATE: 14 BRPM | HEART RATE: 84 BPM | OXYGEN SATURATION: 98 % | BODY MASS INDEX: 25.47 KG/M2 | HEIGHT: 72 IN | SYSTOLIC BLOOD PRESSURE: 100 MMHG | WEIGHT: 188 LBS

## 2023-11-16 DIAGNOSIS — I10 ESSENTIAL HYPERTENSION: ICD-10-CM

## 2023-11-16 DIAGNOSIS — Z12.5 SCREENING FOR PROSTATE CANCER: ICD-10-CM

## 2023-11-16 DIAGNOSIS — S52.532A CLOSED COLLES' FRACTURE OF LEFT RADIUS, INITIAL ENCOUNTER: Primary | ICD-10-CM

## 2023-11-16 RX ORDER — SPIRONOLACTONE 50 MG/1
50 TABLET, FILM COATED ORAL DAILY
Qty: 90 TABLET | Refills: 3 | Status: SHIPPED | OUTPATIENT
Start: 2023-11-16

## 2023-11-16 RX ORDER — METOPROLOL SUCCINATE 25 MG/1
25 TABLET, EXTENDED RELEASE ORAL DAILY
Qty: 90 TABLET | Refills: 3 | Status: SHIPPED | OUTPATIENT
Start: 2023-11-16

## 2023-11-16 NOTE — PROGRESS NOTES
"Chief Complaint  Med Refill    Subjective        Dat Menjivar presents to [facility]  History of Present Illness    Has history of essential hypertension.  Currently spironolactone 50 mg daily.  Salty metoprolol succinate Excell 25 mg daily.  Today blood pressure 100/70.  Denies any side effects of the medication.    Patient states that about 10 weeks ago he did have a fall while using the bathroom, and did have a Colles' fracture on his left wrist.  Patient states that he did not get surgery in [facility] where he lives because he was trying to hope to get here and surgery in [place].  During the process it was casted in about 6 weeks and gone by he had recently seen an orthopedic hand specialist here in [place].  He was advised that the bone had already healed, and the swelling was all present, and that if he were to get surgery it would only slow down his healing.  It is unclear whether he will proceed with surgery but as it looks now that he is most likely not.  He seems as if he is doing fairly good otherwise.    Objective   Vital Signs:  /70 (BP Location: Left arm, Patient Position: Sitting, Cuff Size: Adult)   Pulse 84   Resp 14   Ht 182.9 cm (72\")   Wt 85.3 kg (188 lb)   SpO2 98%   BMI 25.50 kg/m²   Estimated body mass index is 25.5 kg/m² as calculated from the following:    Height as of this encounter: 182.9 cm (72\").    Weight as of this encounter: 85.3 kg (188 lb).               Physical Exam  Vitals and nursing note reviewed.   Constitutional:       Appearance: He is well-developed.   HENT:      Head: Normocephalic and atraumatic.   Musculoskeletal:      Cervical back: Normal range of motion and neck supple.   Neurological:      Mental Status: He is alert and oriented to person, place, and time.   Psychiatric:         Behavior: Behavior normal.        Result Review :                   Assessment and Plan   Diagnoses and all orders for this visit:    1. " Closed Colles' fracture of left radius, initial encounter (Primary)    2. Essential hypertension  -     spironolactone (ALDACTONE) 50 MG tablet; Take 1 tablet by mouth Daily.  Dispense: 90 tablet; Refill: 3  -     metoprolol succinate XL (TOPROL-XL) 25 MG 24 hr tablet; Take 1 tablet by mouth Daily.  Dispense: 90 tablet; Refill: 3  -     Comprehensive Metabolic Panel  -     CBC & Differential  -     Lipid Panel With LDL / HDL Ratio    3. Screening for prostate cancer  -     PSA Screen    For essential hypertension, we will continue him on spironolactone as well as metoprolol succinate.  For his Colles' fracture we will continue to follow-up with him, he will follow-up with hand specialty regarding this.         Follow Up   No follow-ups on file.  Patient was given instructions and counseling regarding his condition or for health maintenance advice. Please see specific information pulled into the AVS if appropriate.

## 2023-11-17 ENCOUNTER — PATIENT MESSAGE (OUTPATIENT)
Dept: INTERNAL MEDICINE | Facility: CLINIC | Age: 83
End: 2023-11-17

## 2023-11-17 DIAGNOSIS — R97.20 ELEVATED PSA: Primary | ICD-10-CM

## 2023-11-17 LAB
ALBUMIN SERPL-MCNC: 4.4 G/DL (ref 3.7–4.7)
ALBUMIN/GLOB SERPL: 1.7 {RATIO} (ref 1.2–2.2)
ALP SERPL-CCNC: 69 IU/L (ref 44–121)
ALT SERPL-CCNC: 20 IU/L (ref 0–44)
AST SERPL-CCNC: 27 IU/L (ref 0–40)
BASOPHILS # BLD AUTO: 0 X10E3/UL (ref 0–0.2)
BASOPHILS NFR BLD AUTO: 0 %
BILIRUB SERPL-MCNC: 1 MG/DL (ref 0–1.2)
BUN SERPL-MCNC: 27 MG/DL (ref 8–27)
BUN/CREAT SERPL: 16 (ref 10–24)
CALCIUM SERPL-MCNC: 10 MG/DL (ref 8.6–10.2)
CHLORIDE SERPL-SCNC: 105 MMOL/L (ref 96–106)
CHOLEST SERPL-MCNC: 173 MG/DL (ref 100–199)
CO2 SERPL-SCNC: 22 MMOL/L (ref 20–29)
CREAT SERPL-MCNC: 1.71 MG/DL (ref 0.76–1.27)
EGFRCR SERPLBLD CKD-EPI 2021: 40 ML/MIN/1.73
EOSINOPHIL # BLD AUTO: 0.1 X10E3/UL (ref 0–0.4)
EOSINOPHIL NFR BLD AUTO: 1 %
ERYTHROCYTE [DISTWIDTH] IN BLOOD BY AUTOMATED COUNT: 11.9 % (ref 11.6–15.4)
GLOBULIN SER CALC-MCNC: 2.6 G/DL (ref 1.5–4.5)
GLUCOSE SERPL-MCNC: 98 MG/DL (ref 70–99)
HCT VFR BLD AUTO: 52.3 % (ref 37.5–51)
HDLC SERPL-MCNC: 52 MG/DL
HGB BLD-MCNC: 17.1 G/DL (ref 13–17.7)
IMM GRANULOCYTES # BLD AUTO: 0 X10E3/UL (ref 0–0.1)
IMM GRANULOCYTES NFR BLD AUTO: 0 %
LDLC SERPL CALC-MCNC: 102 MG/DL (ref 0–99)
LDLC/HDLC SERPL: 2 RATIO (ref 0–3.6)
LYMPHOCYTES # BLD AUTO: 1.7 X10E3/UL (ref 0.7–3.1)
LYMPHOCYTES NFR BLD AUTO: 15 %
MCH RBC QN AUTO: 31.8 PG (ref 26.6–33)
MCHC RBC AUTO-ENTMCNC: 32.7 G/DL (ref 31.5–35.7)
MCV RBC AUTO: 97 FL (ref 79–97)
MONOCYTES # BLD AUTO: 0.8 X10E3/UL (ref 0.1–0.9)
MONOCYTES NFR BLD AUTO: 7 %
NEUTROPHILS # BLD AUTO: 8.6 X10E3/UL (ref 1.4–7)
NEUTROPHILS NFR BLD AUTO: 77 %
PLATELET # BLD AUTO: 160 X10E3/UL (ref 150–450)
POTASSIUM SERPL-SCNC: 4.4 MMOL/L (ref 3.5–5.2)
PROT SERPL-MCNC: 7 G/DL (ref 6–8.5)
PSA SERPL-MCNC: 5.5 NG/ML (ref 0–4)
RBC # BLD AUTO: 5.37 X10E6/UL (ref 4.14–5.8)
SODIUM SERPL-SCNC: 143 MMOL/L (ref 134–144)
TRIGL SERPL-MCNC: 105 MG/DL (ref 0–149)
VLDLC SERPL CALC-MCNC: 19 MG/DL (ref 5–40)
WBC # BLD AUTO: 11.3 X10E3/UL (ref 3.4–10.8)

## 2023-11-17 NOTE — PROGRESS NOTES
Please inform the patient of the following abnormal results.  Patient continues to have elevated serum creatinine levels, is slightly elevated but still fairly stable.  He does have elevated PSA.  I know he does see urology, Dr. Kirk, I do think he needs to go see the urology to have this further evaluated.  His PSA is up to 5.5.  He does have elevated white blood cell count, however he had no signs of infection when he was present with me in the room.  He is a patient that comes to the [place] once every 3 months, as he lives in [facility], and spent a week at a time when he comes to the [place].  I would like to try to see if we can coordinate this with the schedule.

## 2024-01-24 ENCOUNTER — TELEPHONE (OUTPATIENT)
Dept: INTERNAL MEDICINE | Facility: CLINIC | Age: 84
End: 2024-01-24

## 2024-02-09 DIAGNOSIS — J30.9 ALLERGIC RHINITIS, UNSPECIFIED SEASONALITY, UNSPECIFIED TRIGGER: ICD-10-CM

## 2024-02-12 RX ORDER — MONTELUKAST SODIUM 10 MG/1
10 TABLET ORAL
Qty: 90 TABLET | Refills: 1 | Status: SHIPPED | OUTPATIENT
Start: 2024-02-12 | End: 2024-02-16 | Stop reason: SDUPTHER

## 2024-02-16 ENCOUNTER — OFFICE VISIT (OUTPATIENT)
Dept: INTERNAL MEDICINE | Facility: CLINIC | Age: 84
End: 2024-02-16
Payer: MEDICARE

## 2024-02-16 VITALS
SYSTOLIC BLOOD PRESSURE: 100 MMHG | WEIGHT: 183 LBS | HEART RATE: 86 BPM | BODY MASS INDEX: 24.79 KG/M2 | DIASTOLIC BLOOD PRESSURE: 70 MMHG | HEIGHT: 72 IN | OXYGEN SATURATION: 96 % | RESPIRATION RATE: 14 BRPM

## 2024-02-16 DIAGNOSIS — I48.0 PAROXYSMAL ATRIAL FIBRILLATION: ICD-10-CM

## 2024-02-16 DIAGNOSIS — R73.03 PREDIABETES: ICD-10-CM

## 2024-02-16 DIAGNOSIS — R97.20 ELEVATED PSA: ICD-10-CM

## 2024-02-16 DIAGNOSIS — E78.5 DYSLIPIDEMIA: ICD-10-CM

## 2024-02-16 DIAGNOSIS — I10 ESSENTIAL HYPERTENSION: Primary | ICD-10-CM

## 2024-02-16 DIAGNOSIS — J30.9 ALLERGIC RHINITIS, UNSPECIFIED SEASONALITY, UNSPECIFIED TRIGGER: ICD-10-CM

## 2024-02-16 RX ORDER — MONTELUKAST SODIUM 10 MG/1
10 TABLET ORAL
Qty: 90 TABLET | Refills: 1 | Status: SHIPPED | OUTPATIENT
Start: 2024-02-16

## 2024-02-16 RX ORDER — SPIRONOLACTONE 100 MG/1
50 TABLET, FILM COATED ORAL DAILY
Qty: 90 TABLET | Refills: 3 | Status: SHIPPED | OUTPATIENT
Start: 2024-02-16 | End: 2024-02-16 | Stop reason: SDUPTHER

## 2024-02-16 RX ORDER — SPIRONOLACTONE 100 MG/1
50 TABLET, FILM COATED ORAL DAILY
Qty: 90 TABLET | Refills: 3 | Status: SHIPPED | OUTPATIENT
Start: 2024-02-16

## 2024-02-16 RX ORDER — METOPROLOL SUCCINATE 25 MG/1
25 TABLET, EXTENDED RELEASE ORAL DAILY
Qty: 90 TABLET | Refills: 3 | Status: SHIPPED | OUTPATIENT
Start: 2024-02-16

## 2024-02-17 LAB
ALBUMIN SERPL-MCNC: 3.9 G/DL (ref 3.5–5.2)
ALBUMIN/GLOB SERPL: 1.6 G/DL
ALP SERPL-CCNC: 82 U/L (ref 39–117)
ALT SERPL-CCNC: 17 U/L (ref 1–41)
AST SERPL-CCNC: 18 U/L (ref 1–40)
BASOPHILS # BLD AUTO: 0.04 10*3/MM3 (ref 0–0.2)
BASOPHILS NFR BLD AUTO: 0.6 % (ref 0–1.5)
BILIRUB SERPL-MCNC: 0.5 MG/DL (ref 0–1.2)
BUN SERPL-MCNC: 26 MG/DL (ref 8–23)
BUN/CREAT SERPL: 15 (ref 7–25)
CALCIUM SERPL-MCNC: 9.1 MG/DL (ref 8.6–10.5)
CHLORIDE SERPL-SCNC: 109 MMOL/L (ref 98–107)
CHOLEST SERPL-MCNC: 159 MG/DL (ref 0–200)
CO2 SERPL-SCNC: 19.3 MMOL/L (ref 22–29)
CREAT SERPL-MCNC: 1.73 MG/DL (ref 0.76–1.27)
EGFRCR SERPLBLD CKD-EPI 2021: 39.2 ML/MIN/1.73
EOSINOPHIL # BLD AUTO: 0.11 10*3/MM3 (ref 0–0.4)
EOSINOPHIL NFR BLD AUTO: 1.6 % (ref 0.3–6.2)
ERYTHROCYTE [DISTWIDTH] IN BLOOD BY AUTOMATED COUNT: 11.9 % (ref 12.3–15.4)
FT4I SERPL CALC-MCNC: 2 (ref 1.2–4.9)
GLOBULIN SER CALC-MCNC: 2.5 GM/DL
GLUCOSE SERPL-MCNC: 129 MG/DL (ref 65–99)
HBA1C MFR BLD: 5.6 % (ref 4.8–5.6)
HCT VFR BLD AUTO: 52.6 % (ref 37.5–51)
HDLC SERPL-MCNC: 45 MG/DL (ref 40–60)
HGB BLD-MCNC: 17.5 G/DL (ref 13–17.7)
IMM GRANULOCYTES # BLD AUTO: 0.03 10*3/MM3 (ref 0–0.05)
IMM GRANULOCYTES NFR BLD AUTO: 0.4 % (ref 0–0.5)
LDLC SERPL CALC-MCNC: 97 MG/DL (ref 0–100)
LDLC/HDLC SERPL: 2.13 {RATIO}
LYMPHOCYTES # BLD AUTO: 1.5 10*3/MM3 (ref 0.7–3.1)
LYMPHOCYTES NFR BLD AUTO: 21.7 % (ref 19.6–45.3)
MCH RBC QN AUTO: 31.8 PG (ref 26.6–33)
MCHC RBC AUTO-ENTMCNC: 33.3 G/DL (ref 31.5–35.7)
MCV RBC AUTO: 95.6 FL (ref 79–97)
MONOCYTES # BLD AUTO: 0.51 10*3/MM3 (ref 0.1–0.9)
MONOCYTES NFR BLD AUTO: 7.4 % (ref 5–12)
NEUTROPHILS # BLD AUTO: 4.73 10*3/MM3 (ref 1.7–7)
NEUTROPHILS NFR BLD AUTO: 68.3 % (ref 42.7–76)
NRBC BLD AUTO-RTO: 0 /100 WBC (ref 0–0.2)
PLATELET # BLD AUTO: 172 10*3/MM3 (ref 140–450)
POTASSIUM SERPL-SCNC: 4.4 MMOL/L (ref 3.5–5.2)
PROT SERPL-MCNC: 6.4 G/DL (ref 6–8.5)
RBC # BLD AUTO: 5.5 10*6/MM3 (ref 4.14–5.8)
SODIUM SERPL-SCNC: 144 MMOL/L (ref 136–145)
T3RU NFR SERPL: 34 % (ref 24–39)
T4 SERPL-MCNC: 5.8 UG/DL (ref 4.5–12)
TRIGL SERPL-MCNC: 90 MG/DL (ref 0–150)
TSH SERPL DL<=0.005 MIU/L-ACNC: 1.64 UIU/ML (ref 0.45–4.5)
VLDLC SERPL CALC-MCNC: 17 MG/DL (ref 5–40)
WBC # BLD AUTO: 6.92 10*3/MM3 (ref 3.4–10.8)

## 2024-02-21 LAB
PSA SERPL-MCNC: 13.3 NG/ML (ref 0–4)
TESTOST SERPL-MCNC: 697 NG/DL (ref 264–916)
TESTOSTERONE.FREE+WB MFR SERPL: 23.4 % (ref 9–46)
TESTOSTERONE.FREE+WB SERPL-MCNC: 163.1 NG/DL (ref 40–250)

## 2024-02-28 ENCOUNTER — TELEPHONE (OUTPATIENT)
Dept: INTERNAL MEDICINE | Facility: CLINIC | Age: 84
End: 2024-02-28
Payer: MEDICARE

## 2024-02-28 NOTE — TELEPHONE ENCOUNTER
Pt lives in [place] most of the time   I spoke with patients son River and informed him that Dat Otto needs to see a Urologist as soon as possible,also if he is taking testosterone he needs to stop taking it.

## 2024-07-18 ENCOUNTER — TELEPHONE (OUTPATIENT)
Dept: INTERNAL MEDICINE | Facility: CLINIC | Age: 84
End: 2024-07-18
Payer: MEDICARE

## 2024-07-18 DIAGNOSIS — R97.20 ELEVATED PSA: Primary | ICD-10-CM

## 2024-07-18 NOTE — TELEPHONE ENCOUNTER
Caller: Dat Menjivar    Relationship: Self    Best call back number: [phone] (SON) SAIRA    What orders are you requesting (i.e. lab or imaging): ULTRA SOUND OF PROSTATE    In what timeframe would the patient need to come in: WEEK OF JULY 29    PATIENT IS CURRENTLY IN [facility], WILL BE COMING TO [place] JULY 22. PATIENT STATES THE PROVIDER HE SEES IN [facility] HAS SUGGESTED THAT PATIENT HAVE ULTRA SOUND OF PROSTATE DUE TO RECURRING UTI'S. PATIENT ASKS THAT DR ORLANDO ORDER AND SCHEDULE ULTRA SOUND, THEN CALL PATIENTS SON (SAIRA) WITH DETAILS   PLEASE ADVISE

## 2024-07-19 NOTE — TELEPHONE ENCOUNTER
I am not sure if you can get an ultrasound of the prostate, but we can do an mri of the prostate. If so can we set him up while in town? Please place orders.

## 2024-07-26 ENCOUNTER — TELEPHONE (OUTPATIENT)
Dept: INTERNAL MEDICINE | Facility: CLINIC | Age: 84
End: 2024-07-26

## 2024-07-26 NOTE — TELEPHONE ENCOUNTER
Hub staff attempted to follow warm transfer process and was unsuccessful     Caller: Dat Menjivar    Relationship to patient: Self    Best call back number:   [phone]    Patient is needing: HAS QUESTIONS ABOUT SCHEDULING STATES THAT DR. ORLANDO'S ASSISTANT NEEDS TO CALL CENTRAL SCHEDULING

## 2024-07-26 NOTE — TELEPHONE ENCOUNTER
"Relay     \"What questions does he have for central scheduling? Please give their phone to him [phone].\"          "

## 2024-07-29 ENCOUNTER — TELEPHONE (OUTPATIENT)
Dept: INTERNAL MEDICINE | Facility: CLINIC | Age: 84
End: 2024-07-29

## 2024-07-29 NOTE — TELEPHONE ENCOUNTER
Caller: Dat Menjivar    Relationship: Self    Best call back number: [phone]    What was the call regarding: PATIENT STATES THAT HIS MRI FOR PROSTATE NEEDS TO BE WITH AND WITHOUT CONTRAST. MRI SCHEDULING SENT DR. ORLANDO A MESSAGE ABOUT THIS LAST WEEK. PLEASE CHANGE ORDER AND NOTIFY PATIENT. HE WOULD LIKE TO HAVE THIS COMPLETED THIS WEEK AS HE LE[place] ON THUR[place].

## 2024-07-31 ENCOUNTER — LAB (OUTPATIENT)
Dept: LAB | Facility: HOSPITAL | Age: 84
End: 2024-07-31
Payer: MEDICARE

## 2024-07-31 ENCOUNTER — OFFICE VISIT (OUTPATIENT)
Dept: INTERNAL MEDICINE | Facility: CLINIC | Age: 84
End: 2024-07-31
Payer: MEDICARE

## 2024-07-31 VITALS
SYSTOLIC BLOOD PRESSURE: 108 MMHG | HEIGHT: 72 IN | DIASTOLIC BLOOD PRESSURE: 66 MMHG | OXYGEN SATURATION: 96 % | HEART RATE: 50 BPM | WEIGHT: 195.2 LBS | BODY MASS INDEX: 26.44 KG/M2

## 2024-07-31 DIAGNOSIS — Z00.00 HEALTHCARE MAINTENANCE: ICD-10-CM

## 2024-07-31 DIAGNOSIS — J32.0 CHRONIC MAXILLARY SINUSITIS: ICD-10-CM

## 2024-07-31 DIAGNOSIS — N41.0 ACUTE PROSTATITIS: ICD-10-CM

## 2024-07-31 DIAGNOSIS — Z12.5 SCREENING FOR PROSTATE CANCER: ICD-10-CM

## 2024-07-31 DIAGNOSIS — R73.03 PREDIABETES: ICD-10-CM

## 2024-07-31 DIAGNOSIS — I10 ESSENTIAL HYPERTENSION: ICD-10-CM

## 2024-07-31 DIAGNOSIS — Z00.00 MEDICARE ANNUAL WELLNESS VISIT, SUBSEQUENT: ICD-10-CM

## 2024-07-31 DIAGNOSIS — N39.0 ACUTE UTI: ICD-10-CM

## 2024-07-31 DIAGNOSIS — Z00.00 VISIT FOR WELL MAN HEALTH CHECK: Primary | ICD-10-CM

## 2024-07-31 DIAGNOSIS — Z86.73 HISTORY OF STROKE: ICD-10-CM

## 2024-07-31 DIAGNOSIS — E78.5 DYSLIPIDEMIA: ICD-10-CM

## 2024-07-31 LAB
ALBUMIN SERPL-MCNC: 4 G/DL (ref 3.5–5.2)
ALBUMIN/GLOB SERPL: 1.4 G/DL
ALP SERPL-CCNC: 76 U/L (ref 39–117)
ALT SERPL W P-5'-P-CCNC: 25 U/L (ref 1–41)
ANION GAP SERPL CALCULATED.3IONS-SCNC: 9 MMOL/L (ref 5–15)
AST SERPL-CCNC: 30 U/L (ref 1–40)
BACTERIA UR QL AUTO: NORMAL /HPF
BASOPHILS # BLD AUTO: 0.04 10*3/MM3 (ref 0–0.2)
BASOPHILS NFR BLD AUTO: 0.6 % (ref 0–1.5)
BILIRUB SERPL-MCNC: 0.5 MG/DL (ref 0–1.2)
BILIRUB UR QL STRIP: NEGATIVE
BUN SERPL-MCNC: 26 MG/DL (ref 8–23)
BUN/CREAT SERPL: 16.8 (ref 7–25)
CALCIUM SPEC-SCNC: 9.5 MG/DL (ref 8.6–10.5)
CHLORIDE SERPL-SCNC: 110 MMOL/L (ref 98–107)
CHOLEST SERPL-MCNC: 161 MG/DL (ref 0–200)
CLARITY UR: CLEAR
CO2 SERPL-SCNC: 21 MMOL/L (ref 22–29)
COLOR UR: YELLOW
CREAT SERPL-MCNC: 1.55 MG/DL (ref 0.76–1.27)
DEPRECATED RDW RBC AUTO: 42.7 FL (ref 37–54)
EGFRCR SERPLBLD CKD-EPI 2021: 44.4 ML/MIN/1.73
EOSINOPHIL # BLD AUTO: 0.15 10*3/MM3 (ref 0–0.4)
EOSINOPHIL NFR BLD AUTO: 2.4 % (ref 0.3–6.2)
ERYTHROCYTE [DISTWIDTH] IN BLOOD BY AUTOMATED COUNT: 11.9 % (ref 12.3–15.4)
GLOBULIN UR ELPH-MCNC: 2.8 GM/DL
GLUCOSE SERPL-MCNC: 86 MG/DL (ref 65–99)
GLUCOSE UR STRIP-MCNC: NEGATIVE MG/DL
HBA1C MFR BLD: 5.5 % (ref 4.8–5.6)
HCT VFR BLD AUTO: 49.6 % (ref 37.5–51)
HDLC SERPL-MCNC: 44 MG/DL (ref 40–60)
HGB BLD-MCNC: 16.2 G/DL (ref 13–17.7)
HGB UR QL STRIP.AUTO: NEGATIVE
HYALINE CASTS UR QL AUTO: NORMAL /LPF
IMM GRANULOCYTES # BLD AUTO: 0.02 10*3/MM3 (ref 0–0.05)
IMM GRANULOCYTES NFR BLD AUTO: 0.3 % (ref 0–0.5)
KETONES UR QL STRIP: NEGATIVE
LDLC SERPL CALC-MCNC: 95 MG/DL (ref 0–100)
LDLC/HDLC SERPL: 2.11 {RATIO}
LEUKOCYTE ESTERASE UR QL STRIP.AUTO: NEGATIVE
LYMPHOCYTES # BLD AUTO: 1.64 10*3/MM3 (ref 0.7–3.1)
LYMPHOCYTES NFR BLD AUTO: 25.9 % (ref 19.6–45.3)
MCH RBC QN AUTO: 31.8 PG (ref 26.6–33)
MCHC RBC AUTO-ENTMCNC: 32.7 G/DL (ref 31.5–35.7)
MCV RBC AUTO: 97.4 FL (ref 79–97)
MONOCYTES # BLD AUTO: 0.56 10*3/MM3 (ref 0.1–0.9)
MONOCYTES NFR BLD AUTO: 8.8 % (ref 5–12)
NEUTROPHILS NFR BLD AUTO: 3.93 10*3/MM3 (ref 1.7–7)
NEUTROPHILS NFR BLD AUTO: 62 % (ref 42.7–76)
NITRITE UR QL STRIP: NEGATIVE
NRBC BLD AUTO-RTO: 0 /100 WBC (ref 0–0.2)
PH UR STRIP.AUTO: 5.5 [PH] (ref 5–8)
PLATELET # BLD AUTO: 155 10*3/MM3 (ref 140–450)
PMV BLD AUTO: 9.8 FL (ref 6–12)
POTASSIUM SERPL-SCNC: 4.3 MMOL/L (ref 3.5–5.2)
PROT SERPL-MCNC: 6.8 G/DL (ref 6–8.5)
PROT UR QL STRIP: NEGATIVE
PSA SERPL-MCNC: 1.49 NG/ML (ref 0–4)
RBC # BLD AUTO: 5.09 10*6/MM3 (ref 4.14–5.8)
RBC # UR STRIP: NORMAL /HPF
REF LAB TEST METHOD: NORMAL
SODIUM SERPL-SCNC: 140 MMOL/L (ref 136–145)
SP GR UR STRIP: 1.02 (ref 1–1.03)
SQUAMOUS #/AREA URNS HPF: NORMAL /HPF
T-UPTAKE NFR SERPL: 0.91 TBI (ref 0.8–1.3)
T4 SERPL-MCNC: 5.81 MCG/DL (ref 4.5–11.7)
TRIGL SERPL-MCNC: 120 MG/DL (ref 0–150)
TSH SERPL DL<=0.05 MIU/L-ACNC: 1.9 UIU/ML (ref 0.27–4.2)
UROBILINOGEN UR QL STRIP: NORMAL
VLDLC SERPL-MCNC: 22 MG/DL (ref 5–40)
WBC # UR STRIP: NORMAL /HPF
WBC NRBC COR # BLD AUTO: 6.34 10*3/MM3 (ref 3.4–10.8)

## 2024-07-31 PROCEDURE — 84436 ASSAY OF TOTAL THYROXINE: CPT | Performed by: FAMILY MEDICINE

## 2024-07-31 PROCEDURE — 99397 PER PM REEVAL EST PAT 65+ YR: CPT | Performed by: FAMILY MEDICINE

## 2024-07-31 PROCEDURE — 84443 ASSAY THYROID STIM HORMONE: CPT | Performed by: FAMILY MEDICINE

## 2024-07-31 PROCEDURE — 81001 URINALYSIS AUTO W/SCOPE: CPT | Performed by: FAMILY MEDICINE

## 2024-07-31 PROCEDURE — 1159F MED LIST DOCD IN RCRD: CPT | Performed by: FAMILY MEDICINE

## 2024-07-31 PROCEDURE — G0439 PPPS, SUBSEQ VISIT: HCPCS | Performed by: FAMILY MEDICINE

## 2024-07-31 PROCEDURE — 80061 LIPID PANEL: CPT | Performed by: FAMILY MEDICINE

## 2024-07-31 PROCEDURE — 36415 COLL VENOUS BLD VENIPUNCTURE: CPT | Performed by: FAMILY MEDICINE

## 2024-07-31 PROCEDURE — 85025 COMPLETE CBC W/AUTO DIFF WBC: CPT | Performed by: FAMILY MEDICINE

## 2024-07-31 PROCEDURE — 99214 OFFICE O/P EST MOD 30 MIN: CPT | Performed by: FAMILY MEDICINE

## 2024-07-31 PROCEDURE — 1160F RVW MEDS BY RX/DR IN RCRD: CPT | Performed by: FAMILY MEDICINE

## 2024-07-31 PROCEDURE — 84479 ASSAY OF THYROID (T3 OR T4): CPT | Performed by: FAMILY MEDICINE

## 2024-07-31 PROCEDURE — 1170F FXNL STATUS ASSESSED: CPT | Performed by: FAMILY MEDICINE

## 2024-07-31 PROCEDURE — G0103 PSA SCREENING: HCPCS | Performed by: FAMILY MEDICINE

## 2024-07-31 PROCEDURE — 80053 COMPREHEN METABOLIC PANEL: CPT | Performed by: FAMILY MEDICINE

## 2024-07-31 PROCEDURE — 83036 HEMOGLOBIN GLYCOSYLATED A1C: CPT | Performed by: FAMILY MEDICINE

## 2024-07-31 NOTE — PROGRESS NOTES
Subjective   The ABCs of the Annual Wellness Visit  Medicare Wellness Visit      Dat Menjivar is a 84 y.o. patient who presents for a Medicare Wellness Visit.    The following portions of the patient's history were reviewed and   updated as appropriate: allergies, current medications, past family history, past medical history, past social history, past surgical history, and problem list.    Compared to one year ago, the patient's physical   health is worse.  Compared to one year ago, the patient's mental   health is the same.    Recent Hospitalizations:  He was not admitted to the hospital during the last year.     Current Medical Providers:  Patient Care Team:  Flaco Wylie MD as PCP - General (Family Medicine)    Outpatient Medications Prior to Visit   Medication Sig Dispense Refill    metoprolol succinate XL (TOPROL-XL) 25 MG 24 hr tablet Take 1 tablet by mouth Daily. 90 tablet 3    montelukast (SINGULAIR) 10 MG tablet Take 1 tablet by mouth every night at bedtime. 90 tablet 1    rivaroxaban (XARELTO) 20 MG tablet Take 1 tablet by mouth Daily. 90 tablet 1    spironolactone (Aldactone) 100 MG tablet Take 0.5 tablets by mouth Daily. 90 tablet 3    tamsulosin (FLOMAX) 0.4 MG capsule 24 hr capsule Take 1 capsule by mouth Daily.      traZODone (DESYREL) 100 MG tablet TAKE 1 TABLET BY MOUTH EVERY NIGHT (Patient not taking: Reported on 7/31/2024) 90 tablet 0     No facility-administered medications prior to visit.     No opioid medication identified on active medication list. I have reviewed chart for other potential  high risk medication/s and harmful drug interactions in the elderly.      Aspirin is not on active medication list.  Aspirin use is not indicated based on review of current medical condition/s. Risk of harm outweighs potential benefits.  .    There is no problem list on file for this patient.    Advance Care Planning (Click this link to access ACP Navigator)  Advance Directive is not on file.   "ACP discussion was held with the patient during this visit. Patient does not have an advance directive, declines further assistance.        Objective   Vitals:    24 1221   BP: 108/66   Pulse: 50   SpO2: 96%   Weight: 88.5 kg (195 lb 3.2 oz)   Height: 182.9 cm (72.01\")       Estimated body mass index is 26.47 kg/m² as calculated from the following:    Height as of this encounter: 182.9 cm (72.01\").    Weight as of this encounter: 88.5 kg (195 lb 3.2 oz).    BMI is >= 25 and <30. (Overweight) The following options were offered after discussion;: exercise counseling/recommendations and nutrition counseling/recommendations        Does the patient have evidence of cognitive impairment? No                                                                                               Health  Risk Assessment    Smoking Status:  Social History     Tobacco Use   Smoking Status Former    Current packs/day: 0.00    Average packs/day: 0.3 packs/day for 5.0 years (1.3 ttl pk-yrs)    Types: Cigarettes, Cigars    Start date: 1960    Quit date: 1964    Years since quittin.6    Passive exposure: Past   Smokeless Tobacco Never   Tobacco Comments    not since      Alcohol Consumption:  Social History     Substance and Sexual Activity   Alcohol Use Not Currently    Alcohol/week: 8.0 - 9.0 standard drinks of alcohol    Types: 3 Glasses of wine, 3 Shots of liquor, 2 - 3 Drinks containing 0.5 oz of alcohol per week    Comment: couple of gls wine per week     Fall Risk Screen:  FLORENTINO Fall Risk Assessment was completed, and patient is at LOW risk for falls.Assessment completed on:2024    Depression Screenin/31/2024    12:27 PM   PHQ-2/PHQ-9 Depression Screening   Little Interest or Pleasure in Doing Things 0-->not at all   Feeling Down, Depressed or Hopeless 0-->not at all   PHQ-9: Brief Depression Severity Measure Score 0     Health Habits and Functional and Cognitive Screenin/31/2024    12:00 " PM   Functional & Cognitive Status   Do you have difficulty preparing food and eating? No   Do you have difficulty bathing yourself, getting dressed or grooming yourself? No   Do you have difficulty using the toilet? No   Do you have difficulty moving around from place to place? No   Do you have trouble with steps or getting out of a bed or a chair? No   Current Diet Well Balanced Diet   Dental Exam Up to date   Eye Exam Up to date   Exercise (times per week) 6 times per week   Current Exercises Include Aerobics   Do you need help using the phone?  No   Are you deaf or do you have serious difficulty hearing?  Yes   Do you need help to go to places out of walking distance? No   Do you need help shopping? No   Do you need help preparing meals?  No   Do you need help with housework?  No   Do you need help with laundry? No   Do you need help taking your medications? No   Do you need help managing money? No   Do you ever drive or ride in a car without wearing a seat belt? No   Have you felt unusual stress, anger or loneliness in the last month? No   Who do you live with? Alone   If you need help, do you have trouble finding someone available to you? No   Have you been bothered in the last four weeks by sexual problems? No   Do you have difficulty concentrating, remembering or making decisions? No             Age-appropriate Screening Schedule:  Refer to the list below for future screening recommendations based on patient's age, sex and/or medical conditions. Orders for these recommended tests are listed in the plan section. The patient has been provided with a written plan.    Health Maintenance List  Health Maintenance   Topic Date Due    TDAP/TD VACCINES (1 - Tdap) Never done    ZOSTER VACCINE (1 of 2) Never done    RSV Vaccine - Adults (1 - 1-dose 60+ series) Never done    Pneumococcal Vaccine 65+ (1 of 1 - PCV) Never done    COVID-19 Vaccine (2 - 2023-24 season) 09/01/2023    ANNUAL WELLNESS VISIT  07/11/2024    BMI  FOLLOWUP  07/11/2024    INFLUENZA VACCINE  08/01/2024                                                                                                                                                CMS Preventative Services Quick Reference  Risk Factors Identified During Encounter  None Identified    The above risks/problems have been discussed with the patient.  Pertinent information has been shared with the patient in the After Visit Summary.  An After Visit Summary and PPPS were made available to the patient.    Follow Up:   Next Medicare Wellness visit to be scheduled in 1 year.     Assessment & Plan  Visit for well man health check    Healthcare maintenance    Medicare annual wellness visit, subsequent    Acute UTI    Acute prostatitis    Screening for prostate cancer    Prediabetes    Dyslipidemia    Essential hypertension    Orders Placed This Encounter   Procedures    Comprehensive Metabolic Panel    Hemoglobin A1c    Thyroid Panel With TSH    Lipid Panel With LDL / HDL Ratio    PSA Screen    CBC & Differential    Urinalysis With Microscopic - Urine, Clean Catch             Follow Up:   No follow-ups on file.

## 2024-07-31 NOTE — PROGRESS NOTES
Subjective   Dat Menjivar is a 84 y.o. male and is here for a comprehensive physical exam. The patient reports no problems.            Social History:   Social History     Socioeconomic History    Marital status: Single    Number of children: 1   Tobacco Use    Smoking status: Former     Current packs/day: 0.00     Average packs/day: 0.3 packs/day for 5.0 years (1.3 ttl pk-yrs)     Types: Cigarettes, Cigars     Start date: 1960     Quit date: 1964     Years since quittin.6     Passive exposure: Past    Smokeless tobacco: Never    Tobacco comments:     not since    Vaping Use    Vaping status: Never Used   Substance and Sexual Activity    Alcohol use: Not Currently     Alcohol/week: 8.0 - 9.0 standard drinks of alcohol     Types: 3 Glasses of wine, 3 Shots of liquor, 2 - 3 Drinks containing 0.5 oz of alcohol per week     Comment: couple of gls wine per week    Drug use: Never    Sexual activity: Yes     Partners: Female       Family History:   Family History   Problem Relation Age of Onset    Diabetes Sister     Arthritis Sister     Arthritis Sister     Diabetes Sister     Hearing loss Sister        Past Medical History:   Past Medical History:   Diagnosis Date    Clotting disorder     Depression     Fracture of wrist why I'm here    HL (hearing loss)     Wrist sprain part of fracture       The following portions of the patient's history were reviewed and updated as appropriate: allergies, current medications, past family history, past medical history, past social history, past surgical history and problem list.    Review of Systems    Review of Systems   Constitutional:  Negative for chills and fever.   HENT:  Negative for congestion, rhinorrhea, sinus pain and sore throat.    Eyes:  Negative for photophobia and visual disturbance.   Respiratory:  Negative for cough, chest tightness and shortness of breath.    Cardiovascular:  Negative for chest pain and palpitations.   Gastrointestinal:   Negative for diarrhea, nausea and vomiting.   Genitourinary:  Negative for dysuria, frequency and urgency.   Skin:  Negative for rash and wound.   Neurological:  Negative for dizziness and syncope.   Psychiatric/Behavioral:  Negative for behavioral problems and confusion.        Objective   Physical Exam  Vitals and nursing note reviewed.   Constitutional:       Appearance: He is well-developed.   HENT:      Head: Normocephalic and atraumatic.      Right Ear: External ear normal.      Left Ear: External ear normal.   Cardiovascular:      Rate and Rhythm: Normal rate and regular rhythm.      Heart sounds: Normal heart sounds.   Pulmonary:      Effort: Pulmonary effort is normal. No respiratory distress.      Breath sounds: Normal breath sounds.   Abdominal:      Palpations: Abdomen is soft.      Tenderness: There is no abdominal tenderness. There is no guarding.   Musculoskeletal:         General: Normal range of motion.      Cervical back: Normal range of motion and neck supple.   Lymphadenopathy:      Cervical: No cervical adenopathy.   Skin:     General: Skin is warm.   Neurological:      Mental Status: He is alert and oriented to person, place, and time.   Psychiatric:         Behavior: Behavior normal.         Vitals:    07/31/24 1221   BP: 108/66   Pulse: 50   SpO2: 96%     Body mass index is 26.47 kg/m².    Medications:   Current Outpatient Medications:     metoprolol succinate XL (TOPROL-XL) 25 MG 24 hr tablet, Take 1 tablet by mouth Daily., Disp: 90 tablet, Rfl: 3    montelukast (SINGULAIR) 10 MG tablet, Take 1 tablet by mouth every night at bedtime., Disp: 90 tablet, Rfl: 1    rivaroxaban (XARELTO) 20 MG tablet, Take 1 tablet by mouth Daily., Disp: 90 tablet, Rfl: 1    spironolactone (Aldactone) 100 MG tablet, Take 0.5 tablets by mouth Daily., Disp: 90 tablet, Rfl: 3    tamsulosin (FLOMAX) 0.4 MG capsule 24 hr capsule, Take 1 capsule by mouth Daily., Disp: , Rfl:     traZODone (DESYREL) 100 MG tablet, TAKE  1 TABLET BY MOUTH EVERY NIGHT (Patient not taking: Reported on 7/31/2024), Disp: 90 tablet, Rfl: 0       Assessment & Plan   Healthy male exam.      1. Healthcare Maintenance:  2. Patient Counseling:  --Nutrition: Stressed importance of moderation in sodium/caffeine intake, saturated fat and cholesterol, caloric balance, sufficient intake of fresh fruits, vegetables, fiber, calcium and vit D  --Exercise: Recommended 30 minutes of exercise daily.   --Immunizations reviewed.  --Discussed benefits of screening colonoscopy.    Diagnoses and all orders for this visit:    Visit for well man health check    Healthcare maintenance    Medicare annual wellness visit, subsequent    Acute UTI  -     Urinalysis With Microscopic - Urine, Clean Catch    Acute prostatitis    Screening for prostate cancer  -     PSA Screen    Prediabetes  -     Hemoglobin A1c  -     Thyroid Panel With TSH    Dyslipidemia  -     Lipid Panel With LDL / HDL Ratio    Essential hypertension  -     CBC & Differential  -     Comprehensive Metabolic Panel    Chronic maxillary sinusitis    History of stroke        No follow-ups on file.           Dictated utilizing Dragon Voice Recognition Software

## 2024-07-31 NOTE — PROGRESS NOTES
"Chief Complaint  Hypertension (Follow up ) and Medicare Wellness-subsequent    Subjective          Dat Menjivar presents to [facility]  History of Present Illness  The patient is an 84-year-old male who presents for evaluation of recurrent urinary tract infections.    He has been experiencing recurrent urinary tract infections (UTIs) and took his last two-week course of Cipro 500 mg twice daily and a probiotic yesterday. His urine test in [place] tested positive for pneumonia silic bacteria. His condition improved after starting the medication, and he is scheduled for a follow-up lab next week. An MRI of his prostate was not scheduled due to his travel plans. His doctor suggested he may have prostatitis due to two UTIs. He underwent a Transurethral Resection of the Prostate (TURP) procedure three years ago. He no longer has blood in his urine and his last PSA level was 3.7. His doctor recommended bi-weekly urine tests. He also takes anti-inflammatories, collagen, turmeric, and nuts.    He has been experiencing hearing loss, which has worsened. 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 He is scheduled to receive these hearing aids on 09/12/2024.    He has been experiencing sinus issues for the past three months, including sinus drainage. Despite using nasal sprays and other treatments, his symptoms have not improved. He suspects his sinus issues may be related to a stroke. He takes Claritin in the morning and has seen an ENT specialist.    Supplemental Information  He had a minor embolism 25 years ago and a minor brain cap rupture.    Objective   Vital Signs:   /66   Pulse 50   Ht 182.9 cm (72.01\")   Wt 88.5 kg (195 lb 3.2 oz)   SpO2 96%   BMI 26.47 kg/m²     Physical Exam  Vitals and nursing note reviewed.   Constitutional:       Appearance: He is well-developed.   HENT:      Head: Normocephalic and atraumatic. "   Musculoskeletal:      Cervical back: Normal range of motion and neck supple.   Neurological:      Mental Status: He is alert and oriented to person, place, and time.   Psychiatric:         Behavior: Behavior normal.         Physical Exam       Result Review :                 Assessment and Plan    Diagnoses and all orders for this visit:    1. Visit for well man health check (Primary)    2. Healthcare maintenance    3. Medicare annual wellness visit, subsequent    4. Acute UTI  -     Urinalysis With Microscopic - Urine, Clean Catch    5. Acute prostatitis    6. Screening for prostate cancer  -     PSA Screen    7. Prediabetes  -     Hemoglobin A1c  -     Thyroid Panel With TSH    8. Dyslipidemia  -     Lipid Panel With LDL / HDL Ratio    9. Essential hypertension  -     CBC & Differential  -     Comprehensive Metabolic Panel    10. Chronic maxillary sinusitis    11. History of stroke      Assessment & Plan  1. Recurrent UTIs.  Blood work and a urine test will be conducted today. An MRI of the prostate will be ordered through [facility].    2. Hearing loss.  Has obtained some hearing aids that he will try.     3. Sinus issues.  CT scan of the sinuses will be ordered.    4. History of stroke.  MRI of brain has been ordered.    5.  Hypertension  Continue metoprolol succinate XL 25 mg daily.        Follow Up   No follow-ups on file.  Patient was given instructions and counseling regarding his condition or for health maintenance advice. Please see specific information pulled into the AVS if appropriate.           Patient or patient representative verbalized consent for the use of Ambient Listening during the visit with  Flaco Wylie MD for chart documentation. 7/31/2024  13:29 EDT

## 2024-08-04 NOTE — PROGRESS NOTES
Please inform the patient of the following abnormal results. Elevated serum creatnine, but improving. PSA improving.

## 2024-09-17 ENCOUNTER — TELEPHONE (OUTPATIENT)
Dept: INTERNAL MEDICINE | Facility: CLINIC | Age: 84
End: 2024-09-17

## 2024-09-17 NOTE — TELEPHONE ENCOUNTER
Caller: Dat Menjivar    Relationship: Self    Best call back number: [phone]*    What is the medical concern/diagnosis: AFIB    What specialty or service is being requested: CARDIAC ELECTROPHYSIOLOGIST    What is the provider, practice or medical service name: DR. REEMA ORLANDO'S RECOMMENDATION    What is the office location:     What is the office phone number:     Any additional details: PATIENT CALLING FROM [facility], ADVISING THAT HE RECENTLY HAD A MEDICAL EXAMINATION DUE TO AN A-FIB ISSUE, AND WAS RECOMMENDED THAT HE BE REFERRED TO A CARDIAC ELECTROPHYSIOLOGIST. PATIENT REQUEST FOR DR. ORLANDO TO CONTACT HIS SON, MAYTE, AT THE ABOVE TELEPHONE NUMBER, TO GIVE HIM THE INFORMATION OF THE CARDIAC ELECTROPHYSIOLOGIST, THE PATIENT IS BEING REFERRED TO, SO THE PATIENT CAN GET THE INFORMATION FROM HIS SON TO MAKE AN APPOINTMENT.

## 2024-09-18 ENCOUNTER — TELEPHONE (OUTPATIENT)
Dept: CARDIOLOGY | Facility: CLINIC | Age: 84
End: 2024-09-18

## 2024-09-18 NOTE — TELEPHONE ENCOUNTER
Caller: Otto Dat OR SONMAYTE    Relationship: Self    Best call back number: [phone]*    What medication are you requesting: XANAX 1 MG, ONE TIME A DAY AS NEEDED FOR ANXIETY.   VYNADA 50 MG, ONE TABLET 2 TIMES A DAY    What are your current symptoms: PATIENT REQUEST THE XANAX, DUE TO HAVING ANXIETY OVER HEART CONDITION.  THE PATIENT STATES THAT HE HAS BEEN PRESCRIBED THE VYNADA 50 MG, BY A PHYSICIAN IN [facility], FOR HIS HEART, BUT THIS MEDICATION IS TOO EXPENSIVE TO PURCHASE IN [facility], AND IS REQUESTING A PRESCRIPTION SENT TO HIS PHARMACY LOCALLY.    If a prescription is needed, what is your preferred pharmacy and phone number: [id number] [place] [facility] [address] AT [facility] [phone] [facility] [phone] FX     Additional notes: PATIENT REQUEST PRESCRIPTIONS TO BE SENT TO PHARMACY TO  WHEN HE ARRIVES BACK HOME FROM [facility].        
Pt will keep appointment for next week.  
The drug the patient is requesting for his heart, is not available in the [place].  Something similar to that seems to be Entresto.  However I would like a cardiologist to prescribe that.  He does have a cardiologist here in the [place], I would recommend him reaching out to them to perhaps prescribe that medication for him.    As for the Xanax, this is a controlled substance, the patient will need to come in for me to be able to prescribe that.  I am not sure when is the next time that he is able to come in, and I am happy to prescribe it at that time for anxiety.  
Statement Selected

## 2024-09-18 NOTE — TELEPHONE ENCOUNTER
Caller: SAIRA DELGADO    Relationship: Emergency Contact    Best call back number: [phone]      What was the call regarding: PATIENT IS LOOKING FOR REFERRAL FOR ELECTROPHYSIOLOGY, DR LEUNG WILL NEED TO CALL THE PATIENT'S SON, HE'S IN [facility] AND THE SON CAN TRANSFER THE CALL. PATIENT IS GOING TO BE IN THE [facility] ON 09.24.24 TO 10.01.24 AND NEEDS TO BE SEEN FOR AFIB EPISODE THAT ED IN [facility] STATED NEEDED PACEMAKER.

## 2024-09-19 DIAGNOSIS — I48.91 ATRIAL FIBRILLATION, UNSPECIFIED TYPE: Primary | ICD-10-CM

## 2024-09-20 NOTE — TELEPHONE ENCOUNTER
I have placed for cardiology but he needs EP studies (electrophysiology). Please call patient and patients son with details and progress.

## 2024-09-26 ENCOUNTER — OFFICE VISIT (OUTPATIENT)
Dept: INTERNAL MEDICINE | Facility: CLINIC | Age: 84
End: 2024-09-26
Payer: MEDICARE

## 2024-09-26 ENCOUNTER — HOSPITAL ENCOUNTER (OUTPATIENT)
Facility: HOSPITAL | Age: 84
Discharge: HOME | End: 2024-09-26
Admitting: FAMILY MEDICINE
Payer: MEDICARE

## 2024-09-26 VITALS
WEIGHT: 193.5 LBS | BODY MASS INDEX: 26.21 KG/M2 | HEART RATE: 86 BPM | DIASTOLIC BLOOD PRESSURE: 68 MMHG | RESPIRATION RATE: 16 BRPM | OXYGEN SATURATION: 98 % | HEIGHT: 72 IN | TEMPERATURE: 98.5 F | SYSTOLIC BLOOD PRESSURE: 100 MMHG

## 2024-09-26 DIAGNOSIS — I48.0 PAROXYSMAL ATRIAL FIBRILLATION: ICD-10-CM

## 2024-09-26 DIAGNOSIS — F51.01 PRIMARY INSOMNIA: ICD-10-CM

## 2024-09-26 DIAGNOSIS — F41.9 ANXIETY: Primary | ICD-10-CM

## 2024-09-26 DIAGNOSIS — R97.20 ELEVATED PSA: ICD-10-CM

## 2024-09-26 DIAGNOSIS — I10 ESSENTIAL HYPERTENSION: ICD-10-CM

## 2024-09-26 PROCEDURE — G2211 COMPLEX E/M VISIT ADD ON: HCPCS | Performed by: FAMILY MEDICINE

## 2024-09-26 PROCEDURE — 0 GADOBENATE DIMEGLUMINE 529 MG/ML SOLUTION: Performed by: FAMILY MEDICINE

## 2024-09-26 PROCEDURE — 1160F RVW MEDS BY RX/DR IN RCRD: CPT | Performed by: FAMILY MEDICINE

## 2024-09-26 PROCEDURE — 99214 OFFICE O/P EST MOD 30 MIN: CPT | Performed by: FAMILY MEDICINE

## 2024-09-26 PROCEDURE — 72197 MRI PELVIS W/O & W/DYE: CPT

## 2024-09-26 PROCEDURE — 1159F MED LIST DOCD IN RCRD: CPT | Performed by: FAMILY MEDICINE

## 2024-09-26 PROCEDURE — A9577 INJ MULTIHANCE: HCPCS | Performed by: FAMILY MEDICINE

## 2024-09-26 RX ORDER — METOPROLOL SUCCINATE 25 MG/1
25 TABLET, EXTENDED RELEASE ORAL DAILY
Qty: 90 TABLET | Refills: 3 | Status: SHIPPED | OUTPATIENT
Start: 2024-09-26

## 2024-09-26 RX ORDER — TAMSULOSIN HYDROCHLORIDE 0.4 MG/1
1 CAPSULE ORAL DAILY
Qty: 30 CAPSULE | Refills: 6 | Status: SHIPPED | OUTPATIENT
Start: 2024-09-26

## 2024-09-26 RX ORDER — SPIRONOLACTONE 100 MG/1
50 TABLET, FILM COATED ORAL DAILY
Qty: 90 TABLET | Refills: 3 | Status: SHIPPED | OUTPATIENT
Start: 2024-09-26

## 2024-09-26 RX ORDER — ALPRAZOLAM 1 MG
1 TABLET ORAL NIGHTLY PRN
Qty: 90 TABLET | Refills: 1 | Status: SHIPPED | OUTPATIENT
Start: 2024-09-26

## 2024-09-26 RX ORDER — ALPRAZOLAM 1 MG
1 TABLET ORAL 2 TIMES DAILY PRN
COMMUNITY
End: 2024-09-26 | Stop reason: SDUPTHER

## 2024-09-26 RX ADMIN — GADOBENATE DIMEGLUMINE 20 ML: 529 INJECTION, SOLUTION INTRAVENOUS at 09:55

## 2024-09-27 ENCOUNTER — OFFICE VISIT (OUTPATIENT)
Dept: CARDIOLOGY | Facility: CLINIC | Age: 84
End: 2024-09-27
Payer: MEDICARE

## 2024-09-27 VITALS
OXYGEN SATURATION: 100 % | WEIGHT: 197 LBS | HEIGHT: 72 IN | DIASTOLIC BLOOD PRESSURE: 60 MMHG | BODY MASS INDEX: 26.68 KG/M2 | SYSTOLIC BLOOD PRESSURE: 104 MMHG | HEART RATE: 76 BPM

## 2024-09-27 DIAGNOSIS — R06.02 SOB (SHORTNESS OF BREATH): ICD-10-CM

## 2024-09-27 DIAGNOSIS — Z79.01 CHRONIC ANTICOAGULATION: ICD-10-CM

## 2024-09-27 DIAGNOSIS — R07.89 OTHER CHEST PAIN: ICD-10-CM

## 2024-09-27 DIAGNOSIS — I48.21 PERMANENT ATRIAL FIBRILLATION: Primary | Chronic | ICD-10-CM

## 2024-09-27 DIAGNOSIS — I71.21 ANEURYSM OF ASCENDING AORTA WITHOUT RUPTURE: ICD-10-CM

## 2024-09-27 PROCEDURE — 93000 ELECTROCARDIOGRAM COMPLETE: CPT | Performed by: INTERNAL MEDICINE

## 2024-09-27 PROCEDURE — 99214 OFFICE O/P EST MOD 30 MIN: CPT | Performed by: INTERNAL MEDICINE

## 2024-09-27 NOTE — H&P (VIEW-ONLY)
Subjective:     Encounter Date:09/30/24      Patient ID: Dat Menjivar is a 84 y.o. male.    Chief Complaint:  History of Present Illness    Dear Dr. Wylie,    I had the pleasure of seeing this patient in the office today for follow-up his cardiac status.  He has a history of chronic atrial fibrillation as well as moderate aortic insufficiency mild aortic stenosis.  Patient is also known to have an ascending aortic aneurysm.    Patient states that he lives in [place].  He comes in to visit in [place] at times.  He had arranged to have a cardiology visit today.  He does see a cardiologist at times in [facility] that he says is a [facility] trained cardiologist, I do not have records from there.  He said he was recently seen there, they told him he was in atrial fibrillation (he was in atrial fibrillation chronically when I last saw him a year ago), he says they try to cardiovert him several times but it would not work, they then told him that he would benefit from a pacemaker.  He says he did not wear a monitor.  No syncope, no dizziness.  Just shortness of breath.    He apparently had an echocardiogram while there as well, not sure the details.  They did not send him with any information.  We have called to get information but have not received anything yet.    He just notes if he tries to do anything he gets really tired and gets short of breath.  Does not notice any discomfort.  He has not had an ischemic evaluation here and he does not recall having one in [facility].    Patient had an echocardiogram as well as an abdominal aortic ultrasound performed July 2023:  Results for orders placed during the hospital encounter of 07/13/23    Adult Transthoracic Echo Complete W/ Cont if Necessary Per Protocol    Interpretation Summary    Left ventricular systolic function is normal. Calculated left ventricular EF = 56%    Left ventricular diastolic function was indeterminate.    The left atrial cavity is  "severely dilated.    Moderate aortic valve regurgitation is present.    Mild aortic valve stenosis is present.    Peak velocity of the flow distal to the aortic valve is 282.3 cm/s. Aortic valve mean pressure gradient is 24 mmHg.    There is mild, bileaflet mitral valve thickening present.    Estimated right ventricular systolic pressure from tricuspid regurgitation is normal (<35 mmHg).    Moderate dilation of the sinuses of Valsalva is present. Moderate dilation of the aortic arch is present at 4.8cm.    No radiology results for the last 90 days.      The following portions of the patient's history were reviewed and updated as appropriate: allergies, current medications, past family history, past medical history, past social history, past surgical history and problem list.      ECG 12 Lead    Date/Time: 9/27/2024 3:11 PM  Performed by: Tez Mark III, MD    Authorized by: Tez Mark III, MD  Comparison: compared with previous ECG   Similar to previous ECG  Rhythm: atrial fibrillation  Rate: normal  Conduction: conduction normal  QRS axis: normal  Other findings: non-specific ST-T wave changes    Clinical impression: abnormal EKG             Objective:     Vitals:    09/27/24 1344   BP: 104/60   BP Location: Right arm   Patient Position: Sitting   Cuff Size: Adult   Pulse: 76   SpO2: 100%   Weight: 89.4 kg (197 lb)   Height: 182.9 cm (72.01\")     Body mass index is 26.71 kg/m².      Vitals reviewed.   Constitutional:       General: Not in acute distress.     Appearance: Well-developed. Not diaphoretic.   Eyes:      General:         Right eye: No discharge.         Left eye: No discharge.      Conjunctiva/sclera: Conjunctivae normal.   HENT:      Head: Normocephalic and atraumatic.      Nose: Nose normal.   Neck:      Thyroid: No thyromegaly.      Trachea: No tracheal deviation.   Pulmonary:      Effort: Pulmonary effort is normal. No respiratory distress.      Breath sounds: Normal breath sounds. No " stridor.   Chest:      Chest wall: Not tender to palpatation.   Cardiovascular:      Normal rate. Regular rhythm.      Murmurs: There is a grade 1/6 midsystolic murmur at the URSB, radiating to the neck. There is a grade 1/4 high frequency blowing decrescendo, early diastolic murmur at the URSB, radiating to the apex.      . No S3 gallop. No click. No rub.   Pulses:     Intact distal pulses.   Edema:     Peripheral edema absent.   Abdominal:      General: Bowel sounds are normal. There is no distension.      Palpations: Abdomen is soft. There is no abdominal mass.   Musculoskeletal: Normal range of motion.         General: No tenderness or deformity.      Cervical back: Normal range of motion and neck supple. Skin:     General: Skin is warm and dry.      Findings: No erythema or rash.   Neurological:      Mental Status: Alert.   Psychiatric:         Attention and Perception: Attention normal.         Data and records reviewed:     Lab Results   Component Value Date    GLUCOSE 86 07/31/2024    BUN 26 (H) 07/31/2024    CREATININE 1.55 (H) 07/31/2024    EGFRRESULT 39.2 (L) 02/16/2024    EGFR 44.4 (L) 07/31/2024    BCR 16.8 07/31/2024    K 4.3 07/31/2024    CO2 21.0 (L) 07/31/2024    CALCIUM 9.5 07/31/2024    PROTENTOTREF 6.4 02/16/2024    ALBUMIN 4.0 07/31/2024    BILITOT 0.5 07/31/2024    AST 30 07/31/2024    ALT 25 07/31/2024     Lab Results   Component Value Date    CHOL 161 07/31/2024    CHOL 159 07/14/2023     Lab Results   Component Value Date    TRIG 120 07/31/2024    TRIG 90 02/16/2024    TRIG 105 11/16/2023     Lab Results   Component Value Date    HDL 44 07/31/2024    HDL 45 02/16/2024    HDL 52 11/16/2023     Lab Results   Component Value Date    LDL 95 07/31/2024    LDL 97 02/16/2024     (H) 11/16/2023     Lab Results   Component Value Date    VLDL 22 07/31/2024    VLDL 17 02/16/2024    VLDL 19 11/16/2023     Lab Results   Component Value Date    LDLHDL 2.11 07/31/2024    LDLHDL 2.13 02/16/2024     LDLHDL 2.0 11/16/2023     CBC          11/16/2023    09:05 2/16/2024    10:01 7/31/2024    13:13   CBC   WBC 11.3  6.92  6.34    RBC 5.37  5.50  5.09    Hemoglobin 17.1  17.5  16.2    Hematocrit 52.3  52.6  49.6    MCV 97  95.6  97.4    MCH 31.8  31.8  31.8    MCHC 32.7  33.3  32.7    RDW 11.9  11.9  11.9    Platelets 160  172  155      No radiology results for the last 90 days.  Results for orders placed during the hospital encounter of 07/13/23    Adult Transthoracic Echo Complete W/ Cont if Necessary Per Protocol    Interpretation Summary    Left ventricular systolic function is normal. Calculated left ventricular EF = 56%    Left ventricular diastolic function was indeterminate.    The left atrial cavity is severely dilated.    Moderate aortic valve regurgitation is present.    Mild aortic valve stenosis is present.    Peak velocity of the flow distal to the aortic valve is 282.3 cm/s. Aortic valve mean pressure gradient is 24 mmHg.    There is mild, bileaflet mitral valve thickening present.    Estimated right ventricular systolic pressure from tricuspid regurgitation is normal (<35 mmHg).    Moderate dilation of the sinuses of Valsalva is present. Moderate dilation of the aortic arch is present at 4.8cm.    IMPRESSION:  1. Mild aneurysmal dilation of the aortic root and ascending aorta  measuring up to 4.2 cm  2. Scattered sub-6 mm lung nodules. These could be followed up in 12  months to ensure stability      Assessment:          Diagnosis Plan   1. Permanent atrial fibrillation  Stress Test With Myocardial Perfusion One Day    ECG 12 Lead      2. Aneurysm of ascending aorta without rupture  Stress Test With Myocardial Perfusion One Day    ECG 12 Lead      3. Chronic anticoagulation  Stress Test With Myocardial Perfusion One Day    ECG 12 Lead      4. Other chest pain  Stress Test With Myocardial Perfusion One Day    ECG 12 Lead      5. SOB (shortness of breath)  Stress Test With Myocardial Perfusion One Day     ECG 12 Lead               Plan:       1.  Atrial fibrillation-chronic, continue rate control and anticoagulation.  I explained to him that when we had him wear his monitor before that he was in atrial fibrillation throughout with left atrial enlargement that was significant, that in my view a rhythm control strategy would not be successful.  2.  Aortic valve pathology-patient has moderate aortic insufficiency with mild aortic stenosis, continue to follow.  He says that he just had an echocardiogram performed in [facility], we have called to get that report.  3.  Ascending aortic aneurysm-unchanged on most recent echocardiogram July 2024.  4.  Chronic anticoagulation, doing well on Xarelto  5.  Chronic spironolactone therapy, await the results on the lab work.    6.  Scattered sub-6 mm lung nodules,  patient says he is following with you on this.  7.  Exertional dyspnea, I am concerned this could be an anginal equivalent, prior CT scan has demonstrated coronary calcification, we will arrange for a pharmacologic nuclear perfusion study given his underlying atrial fibrillation.      Thank you very much for allowing us to participate in the care of this pleasant patient.  Please don't hesitate to call if I can be of assistance in any way.      Current Outpatient Medications:     ALPRAZolam (XANAX) 1 MG tablet, Take 1 tablet by mouth At Night As Needed for Anxiety., Disp: 90 tablet, Rfl: 1    COLLAGEN PO, Take  by mouth., Disp: , Rfl:     Flaxseed, Linseed, (FLAXSEED OIL PO), Take  by mouth., Disp: , Rfl:     metoprolol succinate XL (TOPROL-XL) 25 MG 24 hr tablet, Take 1 tablet by mouth Daily., Disp: 90 tablet, Rfl: 3    montelukast (SINGULAIR) 10 MG tablet, Take 1 tablet by mouth every night at bedtime., Disp: 90 tablet, Rfl: 1    NON FORMULARY, Take 1 tablet by mouth 2 (Two) Times a Day. Vymada-----50 MG; patient is taking 100 mg daily, Disp: , Rfl:     rivaroxaban (XARELTO) 20 MG tablet, Take 1 tablet by mouth  Daily., Disp: 90 tablet, Rfl: 1    spironolactone (Aldactone) 100 MG tablet, Take 0.5 tablets by mouth Daily., Disp: 90 tablet, Rfl: 3    tamsulosin (FLOMAX) 0.4 MG capsule 24 hr capsule, Take 1 capsule by mouth Daily., Disp: 30 capsule, Rfl: 6         No follow-ups on file.

## 2024-09-27 NOTE — PROGRESS NOTES
Subjective:     Encounter Date:09/30/24      Patient ID: Dat Menjivar is a 84 y.o. male.    Chief Complaint:  History of Present Illness    Dear Dr. Wylie,    I had the pleasure of seeing this patient in the office today for follow-up his cardiac status.  He has a history of chronic atrial fibrillation as well as moderate aortic insufficiency mild aortic stenosis.  Patient is also known to have an ascending aortic aneurysm.    Patient states that he lives in [place].  He comes in to visit in [place] at times.  He had arranged to have a cardiology visit today.  He does see a cardiologist at times in [facility] that he says is a [facility] trained cardiologist, I do not have records from there.  He said he was recently seen there, they told him he was in atrial fibrillation (he was in atrial fibrillation chronically when I last saw him a year ago), he says they try to cardiovert him several times but it would not work, they then told him that he would benefit from a pacemaker.  He says he did not wear a monitor.  No syncope, no dizziness.  Just shortness of breath.    He apparently had an echocardiogram while there as well, not sure the details.  They did not send him with any information.  We have called to get information but have not received anything yet.    He just notes if he tries to do anything he gets really tired and gets short of breath.  Does not notice any discomfort.  He has not had an ischemic evaluation here and he does not recall having one in [facility].    Patient had an echocardiogram as well as an abdominal aortic ultrasound performed July 2023:  Results for orders placed during the hospital encounter of 07/13/23    Adult Transthoracic Echo Complete W/ Cont if Necessary Per Protocol    Interpretation Summary    Left ventricular systolic function is normal. Calculated left ventricular EF = 56%    Left ventricular diastolic function was indeterminate.    The left atrial cavity is  "severely dilated.    Moderate aortic valve regurgitation is present.    Mild aortic valve stenosis is present.    Peak velocity of the flow distal to the aortic valve is 282.3 cm/s. Aortic valve mean pressure gradient is 24 mmHg.    There is mild, bileaflet mitral valve thickening present.    Estimated right ventricular systolic pressure from tricuspid regurgitation is normal (<35 mmHg).    Moderate dilation of the sinuses of Valsalva is present. Moderate dilation of the aortic arch is present at 4.8cm.    No radiology results for the last 90 days.      The following portions of the patient's history were reviewed and updated as appropriate: allergies, current medications, past family history, past medical history, past social history, past surgical history and problem list.      ECG 12 Lead    Date/Time: 9/27/2024 3:11 PM  Performed by: Tez Mark III, MD    Authorized by: Tez Mark III, MD  Comparison: compared with previous ECG   Similar to previous ECG  Rhythm: atrial fibrillation  Rate: normal  Conduction: conduction normal  QRS axis: normal  Other findings: non-specific ST-T wave changes    Clinical impression: abnormal EKG             Objective:     Vitals:    09/27/24 1344   BP: 104/60   BP Location: Right arm   Patient Position: Sitting   Cuff Size: Adult   Pulse: 76   SpO2: 100%   Weight: 89.4 kg (197 lb)   Height: 182.9 cm (72.01\")     Body mass index is 26.71 kg/m².      Vitals reviewed.   Constitutional:       General: Not in acute distress.     Appearance: Well-developed. Not diaphoretic.   Eyes:      General:         Right eye: No discharge.         Left eye: No discharge.      Conjunctiva/sclera: Conjunctivae normal.   HENT:      Head: Normocephalic and atraumatic.      Nose: Nose normal.   Neck:      Thyroid: No thyromegaly.      Trachea: No tracheal deviation.   Pulmonary:      Effort: Pulmonary effort is normal. No respiratory distress.      Breath sounds: Normal breath sounds. No " stridor.   Chest:      Chest wall: Not tender to palpatation.   Cardiovascular:      Normal rate. Regular rhythm.      Murmurs: There is a grade 1/6 midsystolic murmur at the URSB, radiating to the neck. There is a grade 1/4 high frequency blowing decrescendo, early diastolic murmur at the URSB, radiating to the apex.      . No S3 gallop. No click. No rub.   Pulses:     Intact distal pulses.   Edema:     Peripheral edema absent.   Abdominal:      General: Bowel sounds are normal. There is no distension.      Palpations: Abdomen is soft. There is no abdominal mass.   Musculoskeletal: Normal range of motion.         General: No tenderness or deformity.      Cervical back: Normal range of motion and neck supple. Skin:     General: Skin is warm and dry.      Findings: No erythema or rash.   Neurological:      Mental Status: Alert.   Psychiatric:         Attention and Perception: Attention normal.         Data and records reviewed:     Lab Results   Component Value Date    GLUCOSE 86 07/31/2024    BUN 26 (H) 07/31/2024    CREATININE 1.55 (H) 07/31/2024    EGFRRESULT 39.2 (L) 02/16/2024    EGFR 44.4 (L) 07/31/2024    BCR 16.8 07/31/2024    K 4.3 07/31/2024    CO2 21.0 (L) 07/31/2024    CALCIUM 9.5 07/31/2024    PROTENTOTREF 6.4 02/16/2024    ALBUMIN 4.0 07/31/2024    BILITOT 0.5 07/31/2024    AST 30 07/31/2024    ALT 25 07/31/2024     Lab Results   Component Value Date    CHOL 161 07/31/2024    CHOL 159 07/14/2023     Lab Results   Component Value Date    TRIG 120 07/31/2024    TRIG 90 02/16/2024    TRIG 105 11/16/2023     Lab Results   Component Value Date    HDL 44 07/31/2024    HDL 45 02/16/2024    HDL 52 11/16/2023     Lab Results   Component Value Date    LDL 95 07/31/2024    LDL 97 02/16/2024     (H) 11/16/2023     Lab Results   Component Value Date    VLDL 22 07/31/2024    VLDL 17 02/16/2024    VLDL 19 11/16/2023     Lab Results   Component Value Date    LDLHDL 2.11 07/31/2024    LDLHDL 2.13 02/16/2024     LDLHDL 2.0 11/16/2023     CBC          11/16/2023    09:05 2/16/2024    10:01 7/31/2024    13:13   CBC   WBC 11.3  6.92  6.34    RBC 5.37  5.50  5.09    Hemoglobin 17.1  17.5  16.2    Hematocrit 52.3  52.6  49.6    MCV 97  95.6  97.4    MCH 31.8  31.8  31.8    MCHC 32.7  33.3  32.7    RDW 11.9  11.9  11.9    Platelets 160  172  155      No radiology results for the last 90 days.  Results for orders placed during the hospital encounter of 07/13/23    Adult Transthoracic Echo Complete W/ Cont if Necessary Per Protocol    Interpretation Summary    Left ventricular systolic function is normal. Calculated left ventricular EF = 56%    Left ventricular diastolic function was indeterminate.    The left atrial cavity is severely dilated.    Moderate aortic valve regurgitation is present.    Mild aortic valve stenosis is present.    Peak velocity of the flow distal to the aortic valve is 282.3 cm/s. Aortic valve mean pressure gradient is 24 mmHg.    There is mild, bileaflet mitral valve thickening present.    Estimated right ventricular systolic pressure from tricuspid regurgitation is normal (<35 mmHg).    Moderate dilation of the sinuses of Valsalva is present. Moderate dilation of the aortic arch is present at 4.8cm.    IMPRESSION:  1. Mild aneurysmal dilation of the aortic root and ascending aorta  measuring up to 4.2 cm  2. Scattered sub-6 mm lung nodules. These could be followed up in 12  months to ensure stability      Assessment:          Diagnosis Plan   1. Permanent atrial fibrillation  Stress Test With Myocardial Perfusion One Day    ECG 12 Lead      2. Aneurysm of ascending aorta without rupture  Stress Test With Myocardial Perfusion One Day    ECG 12 Lead      3. Chronic anticoagulation  Stress Test With Myocardial Perfusion One Day    ECG 12 Lead      4. Other chest pain  Stress Test With Myocardial Perfusion One Day    ECG 12 Lead      5. SOB (shortness of breath)  Stress Test With Myocardial Perfusion One Day     ECG 12 Lead               Plan:       1.  Atrial fibrillation-chronic, continue rate control and anticoagulation.  I explained to him that when we had him wear his monitor before that he was in atrial fibrillation throughout with left atrial enlargement that was significant, that in my view a rhythm control strategy would not be successful.  2.  Aortic valve pathology-patient has moderate aortic insufficiency with mild aortic stenosis, continue to follow.  He says that he just had an echocardiogram performed in [facility], we have called to get that report.  3.  Ascending aortic aneurysm-unchanged on most recent echocardiogram July 2024.  4.  Chronic anticoagulation, doing well on Xarelto  5.  Chronic spironolactone therapy, await the results on the lab work.    6.  Scattered sub-6 mm lung nodules,  patient says he is following with you on this.  7.  Exertional dyspnea, I am concerned this could be an anginal equivalent, prior CT scan has demonstrated coronary calcification, we will arrange for a pharmacologic nuclear perfusion study given his underlying atrial fibrillation.      Thank you very much for allowing us to participate in the care of this pleasant patient.  Please don't hesitate to call if I can be of assistance in any way.      Current Outpatient Medications:     ALPRAZolam (XANAX) 1 MG tablet, Take 1 tablet by mouth At Night As Needed for Anxiety., Disp: 90 tablet, Rfl: 1    COLLAGEN PO, Take  by mouth., Disp: , Rfl:     Flaxseed, Linseed, (FLAXSEED OIL PO), Take  by mouth., Disp: , Rfl:     metoprolol succinate XL (TOPROL-XL) 25 MG 24 hr tablet, Take 1 tablet by mouth Daily., Disp: 90 tablet, Rfl: 3    montelukast (SINGULAIR) 10 MG tablet, Take 1 tablet by mouth every night at bedtime., Disp: 90 tablet, Rfl: 1    NON FORMULARY, Take 1 tablet by mouth 2 (Two) Times a Day. Vymada-----50 MG; patient is taking 100 mg daily, Disp: , Rfl:     rivaroxaban (XARELTO) 20 MG tablet, Take 1 tablet by mouth  Daily., Disp: 90 tablet, Rfl: 1    spironolactone (Aldactone) 100 MG tablet, Take 0.5 tablets by mouth Daily., Disp: 90 tablet, Rfl: 3    tamsulosin (FLOMAX) 0.4 MG capsule 24 hr capsule, Take 1 capsule by mouth Daily., Disp: 30 capsule, Rfl: 6         No follow-ups on file.

## 2024-09-30 ENCOUNTER — HOSPITAL ENCOUNTER (OUTPATIENT)
Dept: CARDIOLOGY | Facility: HOSPITAL | Age: 84
Discharge: HOME | End: 2024-09-30
Admitting: INTERNAL MEDICINE
Payer: MEDICARE

## 2024-09-30 VITALS — HEIGHT: 72 IN | WEIGHT: 197.09 LBS | BODY MASS INDEX: 26.7 KG/M2

## 2024-09-30 DIAGNOSIS — I71.21 ANEURYSM OF ASCENDING AORTA WITHOUT RUPTURE: ICD-10-CM

## 2024-09-30 DIAGNOSIS — I48.21 PERMANENT ATRIAL FIBRILLATION: Chronic | ICD-10-CM

## 2024-09-30 DIAGNOSIS — R07.89 OTHER CHEST PAIN: ICD-10-CM

## 2024-09-30 DIAGNOSIS — Z79.01 CHRONIC ANTICOAGULATION: ICD-10-CM

## 2024-09-30 DIAGNOSIS — R06.02 SOB (SHORTNESS OF BREATH): ICD-10-CM

## 2024-09-30 LAB
BH CV NUCLEAR PRIOR STUDY: 2
BH CV REST NUCLEAR ISOTOPE DOSE: 10.6 MCI
BH CV STRESS BP STAGE 1: NORMAL
BH CV STRESS COMMENTS STAGE 1: NORMAL
BH CV STRESS DOSE REGADENOSON STAGE 1: 0.4
BH CV STRESS DURATION MIN STAGE 1: 0
BH CV STRESS DURATION SEC STAGE 1: 10
BH CV STRESS HR STAGE 1: 135
BH CV STRESS NUCLEAR ISOTOPE DOSE: 33.4 MCI
BH CV STRESS PROTOCOL 1: NORMAL
BH CV STRESS RECOVERY BP: NORMAL MMHG
BH CV STRESS RECOVERY HR: 114 BPM
BH CV STRESS STAGE 1: 1
LV EF NUC BP: 31 %
MAXIMAL PREDICTED HEART RATE: 138 BPM
PERCENT MAX PREDICTED HR: 97.83 %
STRESS BASELINE BP: NORMAL MMHG
STRESS BASELINE HR: 101 BPM
STRESS PERCENT HR: 115 %
STRESS POST EXERCISE DUR SEC: 10 SEC
STRESS POST PEAK BP: NORMAL MMHG
STRESS POST PEAK HR: 135 BPM
STRESS TARGET HR: 117 BPM

## 2024-09-30 PROCEDURE — 78452 HT MUSCLE IMAGE SPECT MULT: CPT

## 2024-09-30 PROCEDURE — 93016 CV STRESS TEST SUPVJ ONLY: CPT | Performed by: INTERNAL MEDICINE

## 2024-09-30 PROCEDURE — 93017 CV STRESS TEST TRACING ONLY: CPT

## 2024-09-30 PROCEDURE — 93018 CV STRESS TEST I&R ONLY: CPT | Performed by: INTERNAL MEDICINE

## 2024-09-30 PROCEDURE — A9502 TC99M TETROFOSMIN: HCPCS | Performed by: INTERNAL MEDICINE

## 2024-09-30 PROCEDURE — 0 TECHNETIUM TETROFOSMIN KIT: Performed by: INTERNAL MEDICINE

## 2024-09-30 PROCEDURE — 78452 HT MUSCLE IMAGE SPECT MULT: CPT | Performed by: INTERNAL MEDICINE

## 2024-09-30 PROCEDURE — 25010000002 REGADENOSON 0.4 MG/5ML SOLUTION: Performed by: INTERNAL MEDICINE

## 2024-09-30 RX ORDER — REGADENOSON 0.08 MG/ML
0.4 INJECTION, SOLUTION INTRAVENOUS
Status: COMPLETED | OUTPATIENT
Start: 2024-09-30 | End: 2024-09-30

## 2024-09-30 RX ADMIN — TETROFOSMIN 1 DOSE: 1.38 INJECTION, POWDER, LYOPHILIZED, FOR SOLUTION INTRAVENOUS at 11:44

## 2024-09-30 RX ADMIN — REGADENOSON 0.4 MG: 0.08 INJECTION, SOLUTION INTRAVENOUS at 12:27

## 2024-09-30 RX ADMIN — TETROFOSMIN 1 DOSE: 1.38 INJECTION, POWDER, LYOPHILIZED, FOR SOLUTION INTRAVENOUS at 12:27

## 2024-10-01 ENCOUNTER — TELEPHONE (OUTPATIENT)
Dept: INTERNAL MEDICINE | Facility: CLINIC | Age: 84
End: 2024-10-01
Payer: MEDICARE

## 2024-10-01 ENCOUNTER — TELEPHONE (OUTPATIENT)
Dept: CARDIOLOGY | Facility: CLINIC | Age: 84
End: 2024-10-01
Payer: MEDICARE

## 2024-10-01 NOTE — TELEPHONE ENCOUNTER
Called and informed them that we do not have the results of the test. They are going to send them back over.

## 2024-10-01 NOTE — TELEPHONE ENCOUNTER
Caller: [facility]    Relationship: Other    Best call back number: [phone]     What is the best time to reach you: ANY TIME      What was the call regarding: SEPIDEH STATES THAT HE NEEDS TO GET SOMEONE TO CALL THEM TO LET THEM KNOW IF FAX FOR CT SINUS WAS RECEIVED. PLEASE CONTACT TO DISCUSS AND ADVISE.     Is it okay if the provider responds through MyChart: NO

## 2024-10-03 DIAGNOSIS — R94.39 ABNORMAL STRESS TEST: ICD-10-CM

## 2024-10-03 DIAGNOSIS — I42.9 CARDIOMYOPATHY, UNSPECIFIED TYPE: ICD-10-CM

## 2024-10-03 DIAGNOSIS — I48.21 PERMANENT ATRIAL FIBRILLATION: Primary | ICD-10-CM

## 2024-10-03 NOTE — TELEPHONE ENCOUNTER
Pt called in to triage returning Dr. Mark call.  I communicated with Dr. Mark that pt was on line, and he states that he will call pt back.      Pam Valle, RN  Triage RN  10/03/24 08:52 EDT

## 2024-10-04 ENCOUNTER — TRANSCRIBE ORDERS (OUTPATIENT)
Dept: CARDIOLOGY | Facility: CLINIC | Age: 84
End: 2024-10-04
Payer: MEDICARE

## 2024-10-04 DIAGNOSIS — Z01.810 PRE-OPERATIVE CARDIOVASCULAR EXAMINATION: ICD-10-CM

## 2024-10-04 DIAGNOSIS — Z13.6 SCREENING FOR ISCHEMIC HEART DISEASE: Primary | ICD-10-CM

## 2024-10-04 PROBLEM — I42.9 CARDIOMYOPATHY: Status: ACTIVE | Noted: 2024-10-03

## 2024-10-04 PROBLEM — R94.39 ABNORMAL STRESS TEST: Status: ACTIVE | Noted: 2024-10-03

## 2024-10-04 NOTE — PROGRESS NOTES
Finally reached patient- he is en route to [facility].  Will be back in 2 weeks plan [facility] then, to resume entresto which he gets there

## 2024-10-07 ENCOUNTER — TELEPHONE (OUTPATIENT)
Dept: INTERNAL MEDICINE | Facility: CLINIC | Age: 84
End: 2024-10-07
Payer: MEDICARE

## 2024-10-07 NOTE — TELEPHONE ENCOUNTER
Caller: Dat Menjivar    Relationship: Self    Best call back number: [phone]        What was the call regarding: PATIENT CALLING WANTED TO SEE IF HE NEEDS REFERRALS FOR ENT AND UROLOGIST FOR APPOINTMENTS    PATIENT STATES HE HAS BEEN TO UROLOGIST CAN HE GOT TO HIM? PATIENT CAN'T REMEMBER THE NAME       PATIENT STATES HE HAS ALSO SEEN ENT  CAN HE SEE THEM? HE DIDN'T REMEMBER THE NAME    PATIENT WANTED TO KNOW WOULD THE ENT AND UROLOGIST BE ABLE TO SEE THESE TEST HE HAS TAKEN?      PLEASE GIVE CALLBACK

## 2024-10-07 NOTE — TELEPHONE ENCOUNTER
Spoke with pts son per verbal release, he would like to see an ENT and needs a referral entered, also asked about Urology. I advised him he was seeing [facility]. He was unaware if he would need a new referral for urology

## 2024-10-09 DIAGNOSIS — J32.0 CHRONIC MAXILLARY SINUSITIS: ICD-10-CM

## 2024-10-09 DIAGNOSIS — R97.20 ELEVATED PSA: Primary | ICD-10-CM

## 2024-10-23 ENCOUNTER — HOSPITAL ENCOUNTER (OUTPATIENT)
Facility: HOSPITAL | Age: 84
Setting detail: HOSPITAL OUTPATIENT SURGERY
Discharge: HOME | End: 2024-10-23
Attending: INTERNAL MEDICINE | Admitting: INTERNAL MEDICINE
Payer: MEDICARE

## 2024-10-23 ENCOUNTER — LAB (OUTPATIENT)
Dept: LAB | Facility: HOSPITAL | Age: 84
End: 2024-10-23
Payer: MEDICARE

## 2024-10-23 VITALS
BODY MASS INDEX: 25.06 KG/M2 | WEIGHT: 185 LBS | DIASTOLIC BLOOD PRESSURE: 65 MMHG | TEMPERATURE: 96.8 F | HEIGHT: 72 IN | HEART RATE: 49 BPM | OXYGEN SATURATION: 97 % | RESPIRATION RATE: 20 BRPM | SYSTOLIC BLOOD PRESSURE: 116 MMHG

## 2024-10-23 DIAGNOSIS — Z13.6 SCREENING FOR ISCHEMIC HEART DISEASE: ICD-10-CM

## 2024-10-23 DIAGNOSIS — Z01.810 PRE-OPERATIVE CARDIOVASCULAR EXAMINATION: ICD-10-CM

## 2024-10-23 DIAGNOSIS — I48.21 PERMANENT ATRIAL FIBRILLATION: ICD-10-CM

## 2024-10-23 DIAGNOSIS — I42.9 CARDIOMYOPATHY, UNSPECIFIED TYPE: ICD-10-CM

## 2024-10-23 DIAGNOSIS — R94.39 ABNORMAL STRESS TEST: ICD-10-CM

## 2024-10-23 LAB
ANION GAP SERPL CALCULATED.3IONS-SCNC: 8 MMOL/L (ref 5–15)
BASOPHILS # BLD AUTO: 0.03 10*3/MM3 (ref 0–0.2)
BASOPHILS NFR BLD AUTO: 0.5 % (ref 0–1.5)
BUN SERPL-MCNC: 24 MG/DL (ref 8–23)
BUN/CREAT SERPL: 14.5 (ref 7–25)
CALCIUM SPEC-SCNC: 9 MG/DL (ref 8.6–10.5)
CHLORIDE SERPL-SCNC: 109 MMOL/L (ref 98–107)
CO2 SERPL-SCNC: 22 MMOL/L (ref 22–29)
CREAT SERPL-MCNC: 1.65 MG/DL (ref 0.76–1.27)
DEPRECATED RDW RBC AUTO: 39.7 FL (ref 37–54)
EGFRCR SERPLBLD CKD-EPI 2021: 41.2 ML/MIN/1.73
EOSINOPHIL # BLD AUTO: 0.11 10*3/MM3 (ref 0–0.4)
EOSINOPHIL NFR BLD AUTO: 1.9 % (ref 0.3–6.2)
ERYTHROCYTE [DISTWIDTH] IN BLOOD BY AUTOMATED COUNT: 11.6 % (ref 12.3–15.4)
GLUCOSE SERPL-MCNC: 114 MG/DL (ref 65–99)
HCT VFR BLD AUTO: 46.8 % (ref 37.5–51)
HGB BLD-MCNC: 15.7 G/DL (ref 13–17.7)
IMM GRANULOCYTES # BLD AUTO: 0.03 10*3/MM3 (ref 0–0.05)
IMM GRANULOCYTES NFR BLD AUTO: 0.5 % (ref 0–0.5)
LYMPHOCYTES # BLD AUTO: 1.78 10*3/MM3 (ref 0.7–3.1)
LYMPHOCYTES NFR BLD AUTO: 30.7 % (ref 19.6–45.3)
MCH RBC QN AUTO: 31.7 PG (ref 26.6–33)
MCHC RBC AUTO-ENTMCNC: 33.5 G/DL (ref 31.5–35.7)
MCV RBC AUTO: 94.4 FL (ref 79–97)
MONOCYTES # BLD AUTO: 0.48 10*3/MM3 (ref 0.1–0.9)
MONOCYTES NFR BLD AUTO: 8.3 % (ref 5–12)
NEUTROPHILS NFR BLD AUTO: 3.36 10*3/MM3 (ref 1.7–7)
NEUTROPHILS NFR BLD AUTO: 58.1 % (ref 42.7–76)
PLATELET # BLD AUTO: 148 10*3/MM3 (ref 140–450)
PMV BLD AUTO: 9.2 FL (ref 6–12)
POTASSIUM SERPL-SCNC: 4.4 MMOL/L (ref 3.5–5.2)
RBC # BLD AUTO: 4.96 10*6/MM3 (ref 4.14–5.8)
SODIUM SERPL-SCNC: 139 MMOL/L (ref 136–145)
WBC NRBC COR # BLD AUTO: 5.79 10*3/MM3 (ref 3.4–10.8)

## 2024-10-23 PROCEDURE — 25010000002 LIDOCAINE 2% SOLUTION: Performed by: INTERNAL MEDICINE

## 2024-10-23 PROCEDURE — 25810000003 SODIUM CHLORIDE 0.9 % SOLUTION: Performed by: INTERNAL MEDICINE

## 2024-10-23 PROCEDURE — 93458 L HRT ARTERY/VENTRICLE ANGIO: CPT | Performed by: INTERNAL MEDICINE

## 2024-10-23 PROCEDURE — 25010000002 HEPARIN (PORCINE) PER 1000 UNITS: Performed by: INTERNAL MEDICINE

## 2024-10-23 PROCEDURE — C1894 INTRO/SHEATH, NON-LASER: HCPCS | Performed by: INTERNAL MEDICINE

## 2024-10-23 PROCEDURE — 25010000002 MIDAZOLAM PER 1 MG: Performed by: INTERNAL MEDICINE

## 2024-10-23 PROCEDURE — C1769 GUIDE WIRE: HCPCS | Performed by: INTERNAL MEDICINE

## 2024-10-23 PROCEDURE — 25510000001 IOPAMIDOL PER 1 ML: Performed by: INTERNAL MEDICINE

## 2024-10-23 PROCEDURE — 25010000002 FENTANYL CITRATE (PF) 50 MCG/ML SOLUTION: Performed by: INTERNAL MEDICINE

## 2024-10-23 PROCEDURE — 80048 BASIC METABOLIC PNL TOTAL CA: CPT

## 2024-10-23 PROCEDURE — 85025 COMPLETE CBC W/AUTO DIFF WBC: CPT

## 2024-10-23 PROCEDURE — 36415 COLL VENOUS BLD VENIPUNCTURE: CPT

## 2024-10-23 RX ORDER — VERAPAMIL HYDROCHLORIDE 2.5 MG/ML
INJECTION, SOLUTION INTRAVENOUS
Status: DISCONTINUED | OUTPATIENT
Start: 2024-10-23 | End: 2024-10-23 | Stop reason: HOSPADM

## 2024-10-23 RX ORDER — NITROGLYCERIN 0.4 MG/1
0.4 TABLET SUBLINGUAL
Status: DISCONTINUED | OUTPATIENT
Start: 2024-10-23 | End: 2024-10-23 | Stop reason: HOSPADM

## 2024-10-23 RX ORDER — SODIUM CHLORIDE 9 MG/ML
75 INJECTION, SOLUTION INTRAVENOUS CONTINUOUS
Status: DISCONTINUED | OUTPATIENT
Start: 2024-10-23 | End: 2024-10-23 | Stop reason: HOSPADM

## 2024-10-23 RX ORDER — ACETAMINOPHEN 325 MG/1
650 TABLET ORAL EVERY 4 HOURS PRN
Status: DISCONTINUED | OUTPATIENT
Start: 2024-10-23 | End: 2024-10-23 | Stop reason: HOSPADM

## 2024-10-23 RX ORDER — IOPAMIDOL 755 MG/ML
INJECTION, SOLUTION INTRAVASCULAR
Status: DISCONTINUED | OUTPATIENT
Start: 2024-10-23 | End: 2024-10-23 | Stop reason: HOSPADM

## 2024-10-23 RX ORDER — HEPARIN SODIUM 1000 [USP'U]/ML
INJECTION, SOLUTION INTRAVENOUS; SUBCUTANEOUS
Status: DISCONTINUED | OUTPATIENT
Start: 2024-10-23 | End: 2024-10-23 | Stop reason: HOSPADM

## 2024-10-23 RX ORDER — ASPIRIN 81 MG/1
81 TABLET ORAL DAILY
COMMUNITY

## 2024-10-23 RX ORDER — FENTANYL CITRATE 50 UG/ML
INJECTION, SOLUTION INTRAMUSCULAR; INTRAVENOUS
Status: DISCONTINUED | OUTPATIENT
Start: 2024-10-23 | End: 2024-10-23 | Stop reason: HOSPADM

## 2024-10-23 RX ORDER — MIDAZOLAM HYDROCHLORIDE 1 MG/ML
INJECTION INTRAMUSCULAR; INTRAVENOUS
Status: DISCONTINUED | OUTPATIENT
Start: 2024-10-23 | End: 2024-10-23 | Stop reason: HOSPADM

## 2024-10-23 RX ORDER — SODIUM CHLORIDE 0.9 % (FLUSH) 0.9 %
10 SYRINGE (ML) INJECTION EVERY 12 HOURS SCHEDULED
Status: DISCONTINUED | OUTPATIENT
Start: 2024-10-23 | End: 2024-10-23 | Stop reason: HOSPADM

## 2024-10-23 RX ORDER — LIDOCAINE HYDROCHLORIDE 20 MG/ML
INJECTION, SOLUTION INFILTRATION; PERINEURAL
Status: DISCONTINUED | OUTPATIENT
Start: 2024-10-23 | End: 2024-10-23 | Stop reason: HOSPADM

## 2024-10-23 RX ADMIN — SODIUM CHLORIDE 75 ML/HR: 9 INJECTION, SOLUTION INTRAVENOUS at 10:47

## 2024-10-23 NOTE — Clinical Note
Hemostasis started on the right radial artery. R-Band was used in achieving hemostasis. Radial compression device applied to vessel. Hemostasis achieved successfully. Closure device additional comment: REHAN sosa 17cc

## 2024-10-23 NOTE — DISCHARGE INSTRUCTIONS
[facility]  [address] [place] [address]    Coronary Angiogram (Radial/Ulnar Approach) After Care    Refer to this sheet in the next few weeks. These instructions provide you with information on caring for yourself after your procedure. Your caregiver may also give you more specific instructions. Your treatment has been planned according to current medical practices, but problems sometimes occur. Call your caregiver if you have any problems or questions after your procedure.    Home Care Instructions:  You may shower the day after the procedure. Remove the bandage (dressing) and gently wash the site with plain soap and water. Gently pat the site dry. You may apply a band aid daily for 2 days if desired.    Do not apply powder or lotion to the site.  Do not submerge the affected site in water for 3 to 5 days or until the site is completely healed.   Do not lift, push or pull anything over 5 pounds for 5 days after your procedure or as directed by your physician.  As a reference, a gallon of milk weighs 8 pounds.   Inspect the site at least twice daily. You may notice some bruising at the site and it may be tender for 1 to 2 weeks.     Increase your fluid intake for the next 2 days.    Keep arm elevated for 24 hours. For the remainder of the day, keep your arm in “Pledge of Allegiance” position when up and about.     You may drive 24 hours after the procedure unless otherwise instructed by your caregiver.  Do not operate machinery or power tools for 24 hours.  A responsible adult should be with you for the first 24 hours after you arrive home. Do not make any important legal decisions or sign legal papers for 24 hours.  Do not drink alcohol for 24 hours.    Metformin or any medications containing Metformin should not be taken for 48 hours after your procedure.      Call Your Doctor if:   You have unusual pain at the radial/ulnar (wrist) site.  You have redness, warmth, swelling, or pain at the  radial/ulnar (wrist) site.  You have drainage (other than a small amount of blood on the dressing).  `You have chills or a fever > 101.  Your arm becomes pale or dark, cool, tingly, or numb.  You develop chest pain, shortness of breath, feel faint or pass out.    You have heavy bleeding from the site, hold pressure on the site for 20 minutes.  If the bleeding stops, apply a fresh bandage and call your cardiologist.  However, if you        continue to have bleeding, call 911 and continue to apply pressure to the site.   You have any symptoms of a stroke.  Remember BE FAST  B-balance. Sudden trouble walking or loss of balance.  E-eyes.  Sudden changes in how you see or a sudden onset of a very bad headache.   F-face. Sudden weakness or loss of feeling of the face or facial droop on one side.   A-arms Sudden weakness or numbness in one arm.  One arm drifts down if they are both held out in front of you. This happens suddenly and usually on one side of the body.   S-speech.  Sudden trouble speaking, slurred speech or trouble understanding what are saying.   T-time  Time to call emergency services.  Write down the symptoms and the time they started.

## 2024-10-23 NOTE — Clinical Note
The right DP pulse is +2. The right PT pulse is +1. The right radial pulse is +2. The right ulnar pulse is +1.

## 2024-10-24 ENCOUNTER — TELEPHONE (OUTPATIENT)
Dept: CARDIOLOGY | Facility: CLINIC | Age: 84
End: 2024-10-24
Payer: MEDICARE

## 2024-10-24 NOTE — TELEPHONE ENCOUNTER
"Patient is calling because he lives in [place], but he is currently in town because he had a heart cath yesterday. He states that previously Dr. Mark was wanting him to be on Entresto, but since he was in [place] he was taking a \"cost[place] rican\" form of Entresto. He said he is taking 50mg BID of this. He is wondering since he is in town if a script for Entresto can be sent to his [facility] pharmacy in [facility] on file.     He does have an appointment with [facility] on 10/29 that he plans to come to and he said if we want to hold off on this for now, he will just continue to take his \"costa rican\" Entresto.     Diana Barton RN  [place] [facility]   "

## 2024-10-29 ENCOUNTER — OFFICE VISIT (OUTPATIENT)
Dept: CARDIOLOGY | Facility: CLINIC | Age: 84
End: 2024-10-29
Payer: MEDICARE

## 2024-10-29 VITALS
OXYGEN SATURATION: 97 % | HEIGHT: 72 IN | HEART RATE: 70 BPM | BODY MASS INDEX: 26.34 KG/M2 | DIASTOLIC BLOOD PRESSURE: 62 MMHG | SYSTOLIC BLOOD PRESSURE: 100 MMHG | WEIGHT: 194.5 LBS

## 2024-10-29 DIAGNOSIS — Z79.01 CHRONIC ANTICOAGULATION: ICD-10-CM

## 2024-10-29 DIAGNOSIS — I71.21 ANEURYSM OF ASCENDING AORTA WITHOUT RUPTURE: ICD-10-CM

## 2024-10-29 DIAGNOSIS — I48.21 PERMANENT ATRIAL FIBRILLATION: Primary | ICD-10-CM

## 2024-10-29 RX ORDER — TADALAFIL 5 MG/1
5 TABLET ORAL DAILY PRN
COMMUNITY

## 2024-10-30 NOTE — PROGRESS NOTES
Subjective:     Encounter Date:10/30/24      Patient ID: Dat Menjivar is a 84 y.o. male.    Chief Complaint:  History of Present Illness    Dear Dr. Wylie,    I had the pleasure of seeing this patient in the office today for follow-up his cardiac status.  Patient has a history of chronic atrial fibrillation, moderate aortic insufficiency moderate aortic stenosis, acing aortic aneurysm, and cardiomyopathy that is nonischemic.    He has returned back from [place].    Comes in today for follow-up after his recent cardiac catheterization:     Results for orders placed during the hospital encounter of 10/23/24    Cardiac Catheterization/Vascular Study    Conclusion    No evidence of obstructive coronary disease.  Sluggish flow throughout the coronary tree likely related to LV dysfunction    Recommendations: Consider formal echocardiogram.  Would rule out alternative causes of cardiomyopathy    HEMODYNAMICS:  LV: 115/8, LVEDP 8, aortic: 100/40    CORONARY ANGIOGRAPHY:  LEFT MAIN:Large-caliber, normal left main coronary artery.  Bifurcates give the LAD and circumflex arteries.  LAD: The LAD is a medium to large caliber vessel.  Normal in the proximal segment.  Tortuous in the midsegment.  Calcification throughout the proximal mid vessel.  There is a 30% mid vessel stenosis.  Distal to that the LAD is normal.  There are 2 small caliber diagonals which are normal.  There is YULISA II flow throughout the LAD.  Ramus: Absent  Circumflex: Large caliber circumflex.  Normal in the proximal segment.  Gives rise to a large caliber mid branching OM which is bifurcating and normal.  RCA: The RCA is a large-caliber dominant vessel.  Normal.  PDA and CLAU are medium and normal.  YULISA II flow throughout.      No chest pain or chest discomfort.  Some shortness of breath with activity, no shortness of breath at rest.  No lower extremity edema.  No orthopnea or PND.  No cough.      Patient had an echocardiogram as well as an  "abdominal aortic ultrasound performed July 2023:  Results for orders placed during the hospital encounter of 07/13/23    Adult Transthoracic Echo Complete W/ Cont if Necessary Per Protocol    Interpretation Summary    Left ventricular systolic function is normal. Calculated left ventricular EF = 56%    Left ventricular diastolic function was indeterminate.    The left atrial cavity is severely dilated.    Moderate aortic valve regurgitation is present.    Mild aortic valve stenosis is present.    Peak velocity of the flow distal to the aortic valve is 282.3 cm/s. Aortic valve mean pressure gradient is 24 mmHg.    There is mild, bileaflet mitral valve thickening present.    Estimated right ventricular systolic pressure from tricuspid regurgitation is normal (<35 mmHg).    Moderate dilation of the sinuses of Valsalva is present. Moderate dilation of the aortic arch is present at 4.8cm.    MRI Prostate With & Without Contrast    Result Date: 10/1/2024  1. Exam is significantly limited by motion and rectal gas. 2. An 8 mm PI-RADS 4 lesion in the right peripheral zone at mid gland. 3. An 7 mm PI-RADS 4 lesion in the contralateral left peripheral zone at mid gland.    This report was finalized on 10/1/2024 12:14 PM by Dr. Edu Ansari M.D on Workstation: [id number]         The following portions of the patient's history were reviewed and updated as appropriate: allergies, current medications, past family history, past medical history, past social history, past surgical history and problem list.    Procedures       Objective:     Vitals:    10/29/24 1514   BP: 100/62   BP Location: Right arm   Patient Position: Sitting   Cuff Size: Adult   Pulse: 70   SpO2: 97%   Weight: 88.2 kg (194 lb 8 oz)   Height: 182.9 cm (72.01\")     Body mass index is 26.37 kg/m².      Vitals reviewed.   Constitutional:       General: Not in acute distress.     Appearance: Well-developed. Not diaphoretic.   Eyes:      General:         Right " eye: No discharge.         Left eye: No discharge.      Conjunctiva/sclera: Conjunctivae normal.   HENT:      Head: Normocephalic and atraumatic.      Nose: Nose normal.   Neck:      Thyroid: No thyromegaly.      Trachea: No tracheal deviation.   Pulmonary:      Effort: Pulmonary effort is normal. No respiratory distress.      Breath sounds: Normal breath sounds. No stridor.   Chest:      Chest wall: Not tender to palpatation.   Cardiovascular:      Normal rate. Regular rhythm.      Murmurs: There is a grade 1/6 midsystolic murmur at the URSB, radiating to the neck. There is a grade 1/4 high frequency blowing decrescendo, early diastolic murmur at the URSB, radiating to the apex.      . No S3 gallop. No click. No rub.   Pulses:     Intact distal pulses.   Edema:     Peripheral edema absent.   Abdominal:      General: Bowel sounds are normal. There is no distension.      Palpations: Abdomen is soft. There is no abdominal mass.   Musculoskeletal: Normal range of motion.         General: No tenderness or deformity.      Cervical back: Normal range of motion and neck supple. Skin:     General: Skin is warm and dry.      Findings: No erythema or rash.   Neurological:      Mental Status: Alert.   Psychiatric:         Attention and Perception: Attention normal.         Data and records reviewed:     Lab Results   Component Value Date    GLUCOSE 114 (H) 10/23/2024    BUN 24 (H) 10/23/2024    CREATININE 1.65 (H) 10/23/2024    EGFRRESULT 39.2 (L) 02/16/2024    EGFR 41.2 (L) 10/23/2024    BCR 14.5 10/23/2024    K 4.4 10/23/2024    CO2 22.0 10/23/2024    CALCIUM 9.0 10/23/2024    PROTENTOTREF 6.4 02/16/2024    ALBUMIN 4.0 07/31/2024    BILITOT 0.5 07/31/2024    AST 30 07/31/2024    ALT 25 07/31/2024     Lab Results   Component Value Date    CHOL 161 07/31/2024    CHOL 159 07/14/2023     Lab Results   Component Value Date    TRIG 120 07/31/2024    TRIG 90 02/16/2024    TRIG 105 11/16/2023     Lab Results   Component Value Date     HDL 44 07/31/2024    HDL 45 02/16/2024    HDL 52 11/16/2023     Lab Results   Component Value Date    LDL 95 07/31/2024    LDL 97 02/16/2024     (H) 11/16/2023     Lab Results   Component Value Date    VLDL 22 07/31/2024    VLDL 17 02/16/2024    VLDL 19 11/16/2023     Lab Results   Component Value Date    LDLHDL 2.11 07/31/2024    LDLHDL 2.13 02/16/2024    LDLHDL 2.0 11/16/2023     CBC          2/16/2024    10:01 7/31/2024    13:13 10/23/2024    09:14   CBC   WBC 6.92  6.34  5.79    RBC 5.50  5.09  4.96    Hemoglobin 17.5  16.2  15.7    Hematocrit 52.6  49.6  46.8    MCV 95.6  97.4  94.4    MCH 31.8  31.8  31.7    MCHC 33.3  32.7  33.5    RDW 11.9  11.9  11.6    Platelets 172  155  148      MRI Prostate With & Without Contrast    Result Date: 10/1/2024  1. Exam is significantly limited by motion and rectal gas. 2. An 8 mm PI-RADS 4 lesion in the right peripheral zone at mid gland. 3. An 7 mm PI-RADS 4 lesion in the contralateral left peripheral zone at mid gland.    This report was finalized on 10/1/2024 12:14 PM by Dr. Edu Ansari M.D on Workstation: [id number]     Results for orders placed during the hospital encounter of 07/13/23    Adult Transthoracic Echo Complete W/ Cont if Necessary Per Protocol    Interpretation Summary    Left ventricular systolic function is normal. Calculated left ventricular EF = 56%    Left ventricular diastolic function was indeterminate.    The left atrial cavity is severely dilated.    Moderate aortic valve regurgitation is present.    Mild aortic valve stenosis is present.    Peak velocity of the flow distal to the aortic valve is 282.3 cm/s. Aortic valve mean pressure gradient is 24 mmHg.    There is mild, bileaflet mitral valve thickening present.    Estimated right ventricular systolic pressure from tricuspid regurgitation is normal (<35 mmHg).    Moderate dilation of the sinuses of Valsalva is present. Moderate dilation of the aortic arch is present at  4.8cm.    IMPRESSION:  1. Mild aneurysmal dilation of the aortic root and ascending aorta  measuring up to 4.2 cm  2. Scattered sub-6 mm lung nodules. These could be followed up in 12  months to ensure stability      Assessment:          Diagnosis Plan   1. Permanent atrial fibrillation  sacubitril-valsartan (ENTRESTO) 24-26 MG tablet      2. Aneurysm of ascending aorta without rupture        3. Chronic anticoagulation                 Plan:       1.  Atrial fibrillation-chronic, continue rate control and anticoagulation.   2.  Aortic valve pathology-patient has moderate aortic insufficiency with mild aortic stenosis, continue to follow.    3.  Ascending aortic aneurysm-unchanged on most recent echocardiogram July 2023.  4.  Chronic anticoagulation, doing well on Xarelto  5.  Chronic spironolactone therapy, await the results on the lab work.    6.  Scattered sub-6 mm lung nodules,  patient says he is following with you on this.  7.  Nonischemic cardiomyopathy-we are going to work to optimize medical therapy.  He says he feels really fatigued on the 25 mg metoprolol some and have him cut it in half and report back in a week.  He is not on SGLT2 inhibition but he has prostate issues and has been having recurrent UTIs recently.  He supposed to be seen by Dr. Kirk in January and have a biopsy some and hold off on SGLT2 inhibition until after that is addressed and we have the results of the biopsy.      Thank you very much for allowing us to participate in the care of this pleasant patient.  Please don't hesitate to call if I can be of assistance in any way.      Current Outpatient Medications:     ALPRAZolam (XANAX) 1 MG tablet, Take 1 tablet by mouth At Night As Needed for Anxiety., Disp: 90 tablet, Rfl: 1    COLLAGEN PO, Take  by mouth., Disp: , Rfl:     Flaxseed, Linseed, (FLAXSEED OIL PO), Take  by mouth., Disp: , Rfl:     metoprolol succinate XL (TOPROL-XL) 25 MG 24 hr tablet, Take 1 tablet by mouth Daily., Disp:  90 tablet, Rfl: 3    NON FORMULARY, Take 1 tablet by mouth 2 (Two) Times a Day. Vymada-----50 MG; patient is taking 100 mg daily, Disp: , Rfl:     rivaroxaban (XARELTO) 20 MG tablet, Take 1 tablet by mouth Daily., Disp: 90 tablet, Rfl: 1    spironolactone (Aldactone) 100 MG tablet, Take 0.5 tablets by mouth Daily., Disp: 90 tablet, Rfl: 3    tadalafil (Cialis) 5 MG tablet, Take 1 tablet by mouth Daily As Needed for Erectile Dysfunction., Disp: , Rfl:     tamsulosin (FLOMAX) 0.4 MG capsule 24 hr capsule, Take 1 capsule by mouth Daily., Disp: 30 capsule, Rfl: 6    sacubitril-valsartan (ENTRESTO) 24-26 MG tablet, Take 1 tablet by mouth 2 (Two) Times a Day., Disp: 180 tablet, Rfl: 3         No follow-ups on file.

## 2024-12-02 DIAGNOSIS — F41.9 ANXIETY: ICD-10-CM

## 2024-12-02 DIAGNOSIS — F51.01 PRIMARY INSOMNIA: ICD-10-CM

## 2024-12-02 NOTE — TELEPHONE ENCOUNTER
Caller: Dat Menjivar    Relationship: Self    Best call back number:   Telephone Information:   Mobile [phone]       Requested Prescriptions:   Requested Prescriptions     Pending Prescriptions Disp Refills    ALPRAZolam (XANAX) 1 MG tablet 90 tablet 1     Sig: Take 1 tablet by mouth At Night As Needed for Anxiety.        Pharmacy where request should be sent: [facility] - [facility] - [address] [facility] AT [address] TO [facility] - [phone]  - [phone] FX     Last office visit with prescribing clinician: 9/26/2024   Last telemedicine visit with prescribing clinician: Visit date not found   Next office visit with prescribing clinician: Visit date not found     Additional details provided by patient: PATIENT HAS A 10 DAY SUPPLY.    Does the patient have less than a 3 day supply:  [] Yes  [x] No    Would you like a call back once the refill request has been completed: [] Yes [x] No    If the office needs to give you a call back, can they leave a voicemail: [] Yes [x] No    Lalita Jack Rep   12/02/24 14:56 EST

## 2024-12-02 NOTE — TELEPHONE ENCOUNTER
Rx Refill Note  Requested Prescriptions     Pending Prescriptions Disp Refills    ALPRAZolam (XANAX) 1 MG tablet 90 tablet 1     Sig: Take 1 tablet by mouth At Night As Needed for Anxiety.      Last office visit with prescribing clinician: 9/26/2024   Last telemedicine visit with prescribing clinician: Visit date not found   Next office visit with prescribing clinician: Visit date not found       {TIP  Please add Last Relevant Lab Date if appropriate: 10/23/24                 Would you like a call back once the refill request has been completed: [] Yes [] No    If the office needs to give you a call back, can they leave a voicemail: [] Yes [] No    Marylu Patel MA  12/02/24, 15:34 EST

## 2024-12-04 RX ORDER — ALPRAZOLAM 1 MG/1
1 TABLET ORAL NIGHTLY PRN
Qty: 90 TABLET | Refills: 1 | Status: SHIPPED | OUTPATIENT
Start: 2024-12-04

## 2024-12-06 DIAGNOSIS — R94.39 ABNORMAL CARDIOVASCULAR STRESS TEST: ICD-10-CM

## 2024-12-06 DIAGNOSIS — I50.23 ACUTE ON CHRONIC SYSTOLIC CHF (CONGESTIVE HEART FAILURE): ICD-10-CM

## 2024-12-06 DIAGNOSIS — I42.9 CARDIOMYOPATHY, UNSPECIFIED TYPE: Primary | ICD-10-CM

## 2024-12-06 DIAGNOSIS — I48.21 PERMANENT ATRIAL FIBRILLATION: ICD-10-CM

## 2024-12-06 DIAGNOSIS — R06.02 SOB (SHORTNESS OF BREATH): ICD-10-CM

## 2024-12-30 ENCOUNTER — TELEPHONE (OUTPATIENT)
Dept: CARDIOLOGY | Facility: CLINIC | Age: 84
End: 2024-12-30
Payer: MEDICARE

## 2024-12-30 NOTE — TELEPHONE ENCOUNTER
Cardiac clearance has been received from [facility], Dr. Ravi Kirk office, requesting clearance for mutual patient to have a prostate biopsy done 01/28/2025. They would like for patient to hold Xarelto for 3 prior to procedure.    Return clearance to [phone]

## 2025-01-02 NOTE — TELEPHONE ENCOUNTER
Cardiac clearance has been faxed and sent over to [facility] at fax number [phone]. Fax was sent with success. Jan/02/2025 2:56:54 PM

## 2025-01-31 ENCOUNTER — HOSPITAL ENCOUNTER (OUTPATIENT)
Dept: CARDIOLOGY | Facility: HOSPITAL | Age: 85
Discharge: HOME | End: 2025-01-31
Payer: MEDICARE

## 2025-01-31 ENCOUNTER — OFFICE VISIT (OUTPATIENT)
Dept: CARDIOLOGY | Facility: CLINIC | Age: 85
End: 2025-01-31
Payer: MEDICARE

## 2025-01-31 VITALS
BODY MASS INDEX: 26.28 KG/M2 | HEIGHT: 72 IN | SYSTOLIC BLOOD PRESSURE: 100 MMHG | HEART RATE: 60 BPM | WEIGHT: 194 LBS | DIASTOLIC BLOOD PRESSURE: 60 MMHG

## 2025-01-31 VITALS — HEIGHT: 72 IN | WEIGHT: 199 LBS | OXYGEN SATURATION: 99 % | BODY MASS INDEX: 26.95 KG/M2

## 2025-01-31 DIAGNOSIS — I42.9 CARDIOMYOPATHY, UNSPECIFIED TYPE: ICD-10-CM

## 2025-01-31 DIAGNOSIS — I10 ESSENTIAL HYPERTENSION: ICD-10-CM

## 2025-01-31 DIAGNOSIS — I50.23 ACUTE ON CHRONIC SYSTOLIC CHF (CONGESTIVE HEART FAILURE): ICD-10-CM

## 2025-01-31 DIAGNOSIS — I42.9 CARDIOMYOPATHY, UNSPECIFIED TYPE: Primary | ICD-10-CM

## 2025-01-31 DIAGNOSIS — R06.02 SOB (SHORTNESS OF BREATH): ICD-10-CM

## 2025-01-31 DIAGNOSIS — R94.39 ABNORMAL CARDIOVASCULAR STRESS TEST: ICD-10-CM

## 2025-01-31 DIAGNOSIS — I48.21 PERMANENT ATRIAL FIBRILLATION: ICD-10-CM

## 2025-01-31 LAB
AORTIC ARCH: 3.5 CM
AORTIC DIMENSIONLESS INDEX: 0.3 (DI)
ASCENDING AORTA: 4.8 CM
AV MEAN PRESS GRAD SYS DOP V1V2: 24 MMHG
AV VMAX SYS DOP: 422 CM/SEC
BH CV ECHO MEAS - AI P1/2T: 401 MSEC
BH CV ECHO MEAS - AO MAX PG: 49 MMHG
BH CV ECHO MEAS - AO ROOT DIAM: 4.6 CM
BH CV ECHO MEAS - AO V2 VTI: 106.6 CM
BH CV ECHO MEAS - AVA(I,D): 1.1 CM2
BH CV ECHO MEAS - EDV(CUBED): 185.2 ML
BH CV ECHO MEAS - EDV(MOD-SP2): 144 ML
BH CV ECHO MEAS - EDV(MOD-SP4): 146 ML
BH CV ECHO MEAS - EF(MOD-SP2): 54.2 %
BH CV ECHO MEAS - EF(MOD-SP4): 49.3 %
BH CV ECHO MEAS - ESV(CUBED): 102.6 ML
BH CV ECHO MEAS - ESV(MOD-SP2): 66 ML
BH CV ECHO MEAS - ESV(MOD-SP4): 74 ML
BH CV ECHO MEAS - FS: 17.9 %
BH CV ECHO MEAS - IVS/LVPW: 1 CM
BH CV ECHO MEAS - IVSD: 1.3 CM
BH CV ECHO MEAS - LV DIASTOLIC VOL/BSA (35-75): 69.4 CM2
BH CV ECHO MEAS - LV MASS(C)D: 322.2 GRAMS
BH CV ECHO MEAS - LV MAX PG: 4.1 MMHG
BH CV ECHO MEAS - LV MEAN PG: 2 MMHG
BH CV ECHO MEAS - LV SYSTOLIC VOL/BSA (12-30): 35.2 CM2
BH CV ECHO MEAS - LV V1 MAX: 101 CM/SEC
BH CV ECHO MEAS - LV V1 VTI: 25 CM
BH CV ECHO MEAS - LVIDD: 5.7 CM
BH CV ECHO MEAS - LVIDS: 4.7 CM
BH CV ECHO MEAS - LVOT AREA: 4 CM2
BH CV ECHO MEAS - LVOT DIAM: 2.26 CM
BH CV ECHO MEAS - LVPWD: 1.3 CM
BH CV ECHO MEAS - SUP REN AO DIAM: 4.1 CM
BH CV ECHO MEAS - SV(LVOT): 100.5 ML
BH CV ECHO MEAS - SV(MOD-SP2): 78 ML
BH CV ECHO MEAS - SV(MOD-SP4): 72 ML
BH CV ECHO MEAS - SVI(LVOT): 47.8 ML/M2
BH CV ECHO MEAS - SVI(MOD-SP2): 37.1 ML/M2
BH CV ECHO MEAS - SVI(MOD-SP4): 34.2 ML/M2
LV EF BIPLANE MOD: 52.2 %
SINUS: 3.5 CM
STJ: 3.7 CM

## 2025-01-31 PROCEDURE — 93306 TTE W/DOPPLER COMPLETE: CPT

## 2025-01-31 RX ORDER — SPIRONOLACTONE 25 MG/1
25 TABLET ORAL DAILY
Qty: 90 TABLET | Refills: 3 | Status: SHIPPED | OUTPATIENT
Start: 2025-01-31 | End: 2025-02-03 | Stop reason: SDUPTHER

## 2025-01-31 RX ORDER — SACUBITRIL AND VALSARTAN 24; 26 MG/1; MG/1
1 TABLET, FILM COATED ORAL 2 TIMES DAILY
COMMUNITY

## 2025-01-31 NOTE — PROGRESS NOTES
Subjective:     Encounter Date:01/31/25      Patient ID: Dat Menjivar is a 84 y.o. male.    Chief Complaint:  History of Present Illness    Dear Dr. Wylie,    I had the pleasure of seeing this patient in the office today for follow-up his cardiac status.  Patient has a history of chronic atrial fibrillation, moderate aortic insufficiency moderate aortic stenosis, acing aortic aneurysm, and cardiomyopathy that is nonischemic.    He has returned back from [place].  He has been doing well but he has been having some spells of dizziness particular when he stands up fast.  No chest pain or chest discomfort.  Just had an echocardiogram, reviewed in detail below.  This fortunately shows recovery function.  His aortic stenosis is a bit more prominent, AI is unchanged.  Still with biatrial enlargement.  Ascending aorta unchanged at 4.8 cm.      Results for orders placed during the hospital encounter of 10/23/24    Cardiac Catheterization/Vascular Study    Conclusion    No evidence of obstructive coronary disease.  Sluggish flow throughout the coronary tree likely related to LV dysfunction    Recommendations: Consider formal echocardiogram.  Would rule out alternative causes of cardiomyopathy    HEMODYNAMICS:  LV: 115/8, LVEDP 8, aortic: 100/40    CORONARY ANGIOGRAPHY:  LEFT MAIN:Large-caliber, normal left main coronary artery.  Bifurcates give the LAD and circumflex arteries.  LAD: The LAD is a medium to large caliber vessel.  Normal in the proximal segment.  Tortuous in the midsegment.  Calcification throughout the proximal mid vessel.  There is a 30% mid vessel stenosis.  Distal to that the LAD is normal.  There are 2 small caliber diagonals which are normal.  There is YULISA II flow throughout the LAD.  Ramus: Absent  Circumflex: Large caliber circumflex.  Normal in the proximal segment.  Gives rise to a large caliber mid branching OM which is bifurcating and normal.  RCA: The RCA is a large-caliber dominant  "vessel.  Normal.  PDA and CLAU are medium and normal.  YULISA II flow throughout.        The following portions of the patient's history were reviewed and updated as appropriate: allergies, current medications, past family history, past medical history, past social history, past surgical history and problem list.    Procedures       Objective:     Vitals:    01/31/25 0944   SpO2: 99%   Weight: 90.3 kg (199 lb)   Height: 182.9 cm (72\")     Body mass index is 26.99 kg/m².      Vitals reviewed.   Constitutional:       General: Not in acute distress.     Appearance: Well-developed. Not diaphoretic.   Eyes:      General:         Right eye: No discharge.         Left eye: No discharge.      Conjunctiva/sclera: Conjunctivae normal.   HENT:      Head: Normocephalic and atraumatic.      Nose: Nose normal.   Neck:      Thyroid: No thyromegaly.      Trachea: No tracheal deviation.   Pulmonary:      Effort: Pulmonary effort is normal. No respiratory distress.      Breath sounds: Normal breath sounds. No stridor.   Chest:      Chest wall: Not tender to palpatation.   Cardiovascular:      Normal rate. Regular rhythm.      Murmurs: There is a grade 1/6 midsystolic murmur at the URSB, radiating to the neck. There is a grade 1/4 high frequency blowing decrescendo, early diastolic murmur at the URSB, radiating to the apex.      . No S3 gallop. No click. No rub.   Pulses:     Intact distal pulses.   Edema:     Peripheral edema absent.   Abdominal:      General: Bowel sounds are normal. There is no distension.      Palpations: Abdomen is soft. There is no abdominal mass.   Musculoskeletal: Normal range of motion.         General: No tenderness or deformity.      Cervical back: Normal range of motion and neck supple. Skin:     General: Skin is warm and dry.      Findings: No erythema or rash.   Neurological:      Mental Status: Alert.   Psychiatric:         Attention and Perception: Attention normal.         Data and records reviewed: "     Lab Results   Component Value Date    GLUCOSE 114 (H) 10/23/2024    BUN 24 (H) 10/23/2024    CREATININE 1.65 (H) 10/23/2024    EGFRRESULT 39.2 (L) 02/16/2024    EGFR 41.2 (L) 10/23/2024    BCR 14.5 10/23/2024    K 4.4 10/23/2024    CO2 22.0 10/23/2024    CALCIUM 9.0 10/23/2024    PROTENTOTREF 6.4 02/16/2024    ALBUMIN 4.0 07/31/2024    BILITOT 0.5 07/31/2024    AST 30 07/31/2024    ALT 25 07/31/2024     Lab Results   Component Value Date    CHOL 161 07/31/2024    CHOL 159 07/14/2023     Lab Results   Component Value Date    TRIG 120 07/31/2024    TRIG 90 02/16/2024    TRIG 105 11/16/2023     Lab Results   Component Value Date    HDL 44 07/31/2024    HDL 45 02/16/2024    HDL 52 11/16/2023     Lab Results   Component Value Date    LDL 95 07/31/2024    LDL 97 02/16/2024     (H) 11/16/2023     Lab Results   Component Value Date    VLDL 22 07/31/2024    VLDL 17 02/16/2024    VLDL 19 11/16/2023     Lab Results   Component Value Date    LDLHDL 2.11 07/31/2024    LDLHDL 2.13 02/16/2024    LDLHDL 2.0 11/16/2023     CBC          2/16/2024    10:01 7/31/2024    13:13 10/23/2024    09:14   CBC   WBC 6.92  6.34  5.79    RBC 5.50  5.09  4.96    Hemoglobin 17.5  16.2  15.7    Hematocrit 52.6  49.6  46.8    MCV 95.6  97.4  94.4    MCH 31.8  31.8  31.7    MCHC 33.3  32.7  33.5    RDW 11.9  11.9  11.6    Platelets 172  155  148      No radiology results for the last 90 days.  Results for orders placed during the hospital encounter of 01/31/25    Adult Transthoracic Echo Complete w/ Color, Spectral and Contrast if Necessary Per Protocol    Interpretation Summary    Left ventricular systolic function is normal. Calculated left ventricular EF = 52.2%    Left ventricular wall thickness is consistent with mild concentric hypertrophy.    Left ventricular diastolic function was indeterminate.    The left atrial cavity is severely dilated.    The right atrial cavity is mildly  dilated.    Moderate aortic valve regurgitation is  present.    Moderate aortic valve stenosis is present. Aortic valve area is 1.1 cm2.    Peak velocity of the flow distal to the aortic valve is 422 cm/s. Aortic valve maximum pressure gradient is 49 mmHg. Aortic valve mean pressure gradient is 24 mmHg.    The aortic root measures 4.6 cm. Mild dilation of the sinuses of Valsalva is present. Moderate dilation of the ascending aorta is present 4.8 cm.    IMPRESSION:  1. Mild aneurysmal dilation of the aortic root and ascending aorta  measuring up to 4.2 cm  2. Scattered sub-6 mm lung nodules. These could be followed up in 12  months to ensure stability      Assessment:          Diagnosis Plan   1. Cardiomyopathy, unspecified type  apixaban (ELIQUIS) 5 MG tablet tablet      2. Essential hypertension  spironolactone (Aldactone) 25 MG tablet    apixaban (ELIQUIS) 5 MG tablet tablet      3. Acute on chronic systolic CHF (congestive heart failure)  apixaban (ELIQUIS) 5 MG tablet tablet                 Plan:       1.  Atrial fibrillation-chronic, continue rate control and anticoagulation.   2.  Aortic valve pathology-patient has moderate aortic insufficiency with moderate aortic stenosis, continue to follow.  We will see him back in 6 months, I would anticipate repeating echocardiogram at that time given some evidence of progression of his stenosis.  3.  Ascending aortic aneurysm-unchanged on most recent echocardiogram   4.  Chronic anticoagulation, doing well on Xarelto but now he thinks it will be cheaper for the Eliquis so I sent in a prescription for the Eliquis.  5.  Chronic spironolactone therapy, await the results on the lab work.    6.  Scattered sub-6 mm lung nodules,  patient says he is following with you on this.  7.  Nonischemic cardiomyopathy-recovery of function, continue current medical therapy except patient reports that he is having some dizziness at times, blood pressure was softer today 100/60 so I will decrease his spironolactone from 50 mg daily to 25 mg  daily.  Patient remains on the Entresto which she gets the international version in [place] and it is 50 mg twice daily.      Thank you very much for allowing us to participate in the care of this pleasant patient.  Please don't hesitate to call if I can be of assistance in any way.      Current Outpatient Medications:     ALPRAZolam (XANAX) 1 MG tablet, Take 1 tablet by mouth At Night As Needed for Anxiety., Disp: 90 tablet, Rfl: 1    COLLAGEN PO, Take  by mouth., Disp: , Rfl:     Flaxseed, Linseed, (FLAXSEED OIL PO), Take  by mouth., Disp: , Rfl:     metoprolol succinate XL (TOPROL-XL) 25 MG 24 hr tablet, Take 1 tablet by mouth Daily., Disp: 90 tablet, Rfl: 3    NON FORMULARY, Take 1 tablet by mouth 2 (Two) Times a Day. Vymada-----50 MG; patient is taking 100 mg daily (Patient taking differently: Take 1 tablet by mouth 2 (Two) Times a Day. Vymada-----50 MG;), Disp: , Rfl:     spironolactone (Aldactone) 25 MG tablet, Take 1 tablet by mouth Daily., Disp: 90 tablet, Rfl: 3    tadalafil (Cialis) 5 MG tablet, Take 1 tablet by mouth Daily As Needed for Erectile Dysfunction., Disp: , Rfl:     tamsulosin (FLOMAX) 0.4 MG capsule 24 hr capsule, Take 1 capsule by mouth Daily., Disp: 30 capsule, Rfl: 6    apixaban (ELIQUIS) 5 MG tablet tablet, Take 1 tablet by mouth 2 (Two) Times a Day., Disp: 180 tablet, Rfl: 3         Return in about 6 months (around 7/31/2025).

## 2025-02-03 ENCOUNTER — OFFICE VISIT (OUTPATIENT)
Dept: INTERNAL MEDICINE | Facility: CLINIC | Age: 85
End: 2025-02-03
Payer: MEDICARE

## 2025-02-03 VITALS
HEIGHT: 72 IN | TEMPERATURE: 98.1 F | DIASTOLIC BLOOD PRESSURE: 68 MMHG | RESPIRATION RATE: 16 BRPM | BODY MASS INDEX: 25.73 KG/M2 | OXYGEN SATURATION: 99 % | SYSTOLIC BLOOD PRESSURE: 102 MMHG | HEART RATE: 50 BPM | WEIGHT: 190 LBS

## 2025-02-03 DIAGNOSIS — I50.9 CONGESTIVE HEART FAILURE, UNSPECIFIED HF CHRONICITY, UNSPECIFIED HEART FAILURE TYPE: Primary | ICD-10-CM

## 2025-02-03 DIAGNOSIS — R00.1 BRADYCARDIA: ICD-10-CM

## 2025-02-03 DIAGNOSIS — I10 ESSENTIAL HYPERTENSION: ICD-10-CM

## 2025-02-03 DIAGNOSIS — R97.20 ELEVATED PSA: ICD-10-CM

## 2025-02-03 PROCEDURE — 1160F RVW MEDS BY RX/DR IN RCRD: CPT | Performed by: FAMILY MEDICINE

## 2025-02-03 PROCEDURE — 1126F AMNT PAIN NOTED NONE PRSNT: CPT | Performed by: FAMILY MEDICINE

## 2025-02-03 PROCEDURE — G2211 COMPLEX E/M VISIT ADD ON: HCPCS | Performed by: FAMILY MEDICINE

## 2025-02-03 PROCEDURE — 99214 OFFICE O/P EST MOD 30 MIN: CPT | Performed by: FAMILY MEDICINE

## 2025-02-03 PROCEDURE — 99397 PER PM REEVAL EST PAT 65+ YR: CPT | Performed by: FAMILY MEDICINE

## 2025-02-03 PROCEDURE — G0439 PPPS, SUBSEQ VISIT: HCPCS | Performed by: FAMILY MEDICINE

## 2025-02-03 PROCEDURE — 1159F MED LIST DOCD IN RCRD: CPT | Performed by: FAMILY MEDICINE

## 2025-02-03 RX ORDER — SPIRONOLACTONE 25 MG/1
25 TABLET ORAL DAILY
Qty: 90 TABLET | Refills: 3 | Status: SHIPPED | OUTPATIENT
Start: 2025-02-03

## 2025-02-03 RX ORDER — METOPROLOL SUCCINATE 25 MG/1
12.5 TABLET, EXTENDED RELEASE ORAL DAILY
Qty: 90 TABLET | Refills: 3 | Status: SHIPPED | OUTPATIENT
Start: 2025-02-03

## 2025-02-04 LAB
ALBUMIN SERPL-MCNC: 3.9 G/DL (ref 3.7–4.7)
ALP SERPL-CCNC: 74 IU/L (ref 44–121)
ALT SERPL-CCNC: 17 IU/L (ref 0–44)
AST SERPL-CCNC: 21 IU/L (ref 0–40)
BASOPHILS # BLD AUTO: 0 X10E3/UL (ref 0–0.2)
BASOPHILS NFR BLD AUTO: 1 %
BILIRUB SERPL-MCNC: 0.3 MG/DL (ref 0–1.2)
BUN SERPL-MCNC: 31 MG/DL (ref 8–27)
BUN/CREAT SERPL: 21 (ref 10–24)
CALCIUM SERPL-MCNC: 9.1 MG/DL (ref 8.6–10.2)
CHLORIDE SERPL-SCNC: 110 MMOL/L (ref 96–106)
CO2 SERPL-SCNC: 20 MMOL/L (ref 20–29)
CREAT SERPL-MCNC: 1.45 MG/DL (ref 0.76–1.27)
EGFRCR SERPLBLD CKD-EPI 2021: 48 ML/MIN/1.73
EOSINOPHIL # BLD AUTO: 0.1 X10E3/UL (ref 0–0.4)
EOSINOPHIL NFR BLD AUTO: 2 %
ERYTHROCYTE [DISTWIDTH] IN BLOOD BY AUTOMATED COUNT: 11.9 % (ref 11.6–15.4)
GLOBULIN SER CALC-MCNC: 2.3 G/DL (ref 1.5–4.5)
GLUCOSE SERPL-MCNC: 82 MG/DL (ref 70–99)
HCT VFR BLD AUTO: 45.9 % (ref 37.5–51)
HGB BLD-MCNC: 15.1 G/DL (ref 13–17.7)
IMM GRANULOCYTES # BLD AUTO: 0 X10E3/UL (ref 0–0.1)
IMM GRANULOCYTES NFR BLD AUTO: 0 %
LYMPHOCYTES # BLD AUTO: 1.2 X10E3/UL (ref 0.7–3.1)
LYMPHOCYTES NFR BLD AUTO: 22 %
MCH RBC QN AUTO: 31.5 PG (ref 26.6–33)
MCHC RBC AUTO-ENTMCNC: 32.9 G/DL (ref 31.5–35.7)
MCV RBC AUTO: 96 FL (ref 79–97)
MONOCYTES # BLD AUTO: 0.4 X10E3/UL (ref 0.1–0.9)
MONOCYTES NFR BLD AUTO: 7 %
NEUTROPHILS # BLD AUTO: 3.8 X10E3/UL (ref 1.4–7)
NEUTROPHILS NFR BLD AUTO: 68 %
PLATELET # BLD AUTO: 145 X10E3/UL (ref 150–450)
POTASSIUM SERPL-SCNC: 4.2 MMOL/L (ref 3.5–5.2)
PROT SERPL-MCNC: 6.2 G/DL (ref 6–8.5)
RBC # BLD AUTO: 4.79 X10E6/UL (ref 4.14–5.8)
SODIUM SERPL-SCNC: 143 MMOL/L (ref 134–144)
WBC # BLD AUTO: 5.5 X10E3/UL (ref 3.4–10.8)

## 2025-02-07 NOTE — PROGRESS NOTES
Please inform the patient of the following abnormal results. Serum creatnine elevated, but improving.

## 2025-02-08 NOTE — PROGRESS NOTES
Chief Complaint  Medicare Wellness-subsequent    Subjective          Dat Menjivar presents to [facility]  History of Present Illness  The patient is an 84-year-old male who presents for evaluation of congestive heart failure, low heart rate, and prostate cancer.    He was diagnosed with congestive heart failure approximately 3 months ago, with a decrease in his ejection fraction from 50% to 31%. He has been adhering to a regular exercise regimen, which includes gym workouts every other day for 2 hours and cycling on alternate days. A recent echocardiogram performed on Friday showed an improvement in his ejection fraction to 52%. He has also experienced weight loss. He reports fatigue, which he attributes to his medication regimen. His heart rate was recorded as 43 during this visit, which increased to 50 upon re-evaluation. He notes that his heart rate remains low even during physical activity such as cycling, where it only reaches around 75. His current medication regimen includes Entresto, Tresiba 50 mg twice daily, metoprolol, and spironolactone 50 mg. He was advised to reduce his spironolactone dosage to 25 mg due to fatigue, but he has not yet picked up the new prescription and has been halving his 50 mg tablets instead.    He has been informed that he has a 4 out of 5 chance of prostate cancer, with lesions identified on both sides of the prostate. He is scheduled for a biopsy on Wednesday. He has discontinued testosterone therapy. He has been advised to hold his Xarelto for 3 days prior to the biopsy. He took his Xarelto this morning and will not take it tomorrow, Wednesday morning, and Thursday. He has been prescribed compression socks by his doctor in [facility] due to his use of blood thinners. Approximately 3 weeks ago, his doctor in [facility] recommended a PSA test and urine test every 60 to 90 days due to recurrent urinary tract infections. His most recent PSA level  "was 1.75. He has previously received Nebido injections in [place], which resulted in an increase in his PSA level to 17.    MEDICATIONS  Entresto, Tresiba, metoprolol, spironolactone, Xarelto    Objective   Vital Signs:   /68 (BP Location: Left arm, Patient Position: Sitting)   Pulse 50   Temp 98.1 °F (36.7 °C) (Oral)   Resp 16   Ht 182.9 cm (72.01\")   Wt 86.2 kg (190 lb)   SpO2 99%   BMI 25.76 kg/m²     Physical Exam  Vitals and nursing note reviewed.   Constitutional:       Appearance: He is well-developed.   HENT:      Head: Normocephalic and atraumatic.   Musculoskeletal:      Cervical back: Normal range of motion and neck supple.   Neurological:      Mental Status: He is alert and oriented to person, place, and time.   Psychiatric:         Behavior: Behavior normal.         Physical Exam       Result Review :                 Assessment and Plan    Diagnoses and all orders for this visit:    1. Congestive heart failure, unspecified HF chronicity, unspecified heart failure type (Primary)  -     CBC & Differential  -     Comprehensive Metabolic Panel    2. Elevated PSA    3. Bradycardia    4. Essential hypertension  -     metoprolol succinate XL (TOPROL-XL) 25 MG 24 hr tablet; Take 0.5 tablets by mouth Daily.  Dispense: 90 tablet; Refill: 3  -     spironolactone (Aldactone) 25 MG tablet; Take 1 tablet by mouth Daily.  Dispense: 90 tablet; Refill: 3      Assessment & Plan  1. Congestive heart failure.  His ejection fraction has improved from 31% to 52%, likely due to weight loss, increased physical activity, and adherence to his medication regimen. The potential benefits of Jardiance or Farxiga were discussed, and he was advised to consult with Dr. Mark regarding this. He was also advised to discuss the possibility of reducing his metoprolol dosage with Dr. Mark. A prescription for metoprolol 12.5 mg daily was provided, with instructions to halve his current 25 mg tablets. A 90-day supply of " metoprolol was sent to [facility]. A prescription for spironolactone 25 mg was also provided and sent to [facility].    2. Prostate cancer.  He is scheduled for a prostate biopsy on Wednesday. He has been informed that he has a 4 out of 5 chance of prostate cancer, with lesions identified on both sides of the prostate. He was advised to discontinue Xarelto for 3 days prior to the procedure. He was also advised to wear compression socks to reduce the risk of blood clots. He will provide the results of the biopsy once available.    3. Low heart rate.  His heart rate was noted to be low, likely due to metoprolol. He was advised to cut his metoprolol dosage in half to 12.5 mg daily. If his heart rate remains in the 40s or 50s, further adjustments will be considered.    Follow-up  The patient will follow up in mid-April.    Follow Up   No follow-ups on file.  Patient was given instructions and counseling regarding his condition or for health maintenance advice. Please see specific information pulled into the AVS if appropriate.           Patient or patient representative verbalized consent for the use of Ambient Listening during the visit with  Flaco Wylie MD for chart documentation. 2/8/2025  10:04 EST

## 2025-02-08 NOTE — PROGRESS NOTES
Subjective   Dat Menjivar is a 84 y.o. male and is here for a comprehensive physical exam. The patient reports no problems.            Social History:   Social History     Socioeconomic History   • Marital status: Single   • Number of children: 1   Tobacco Use   • Smoking status: Former     Current packs/day: 0.00     Average packs/day: 0.3 packs/day for 5.0 years (1.3 ttl pk-yrs)     Types: Cigarettes, Cigars     Start date: 1960     Quit date: 1964     Years since quittin.1     Passive exposure: Past   • Smokeless tobacco: Never   • Tobacco comments:     not since    Vaping Use   • Vaping status: Never Used   Substance and Sexual Activity   • Alcohol use: Yes     Alcohol/week: 4.0 standard drinks of alcohol     Types: 2 Glasses of wine, 2 Drinks containing 0.5 oz of alcohol per week     Comment: couple of gls wine per week   • Drug use: Not Currently     Types: Marijuana     Comment: occasionally   • Sexual activity: Yes     Partners: Female     Birth control/protection: None       Family History:   Family History   Problem Relation Age of Onset   • Diabetes Sister    • Arthritis Sister    • Arthritis Sister    • Diabetes Sister    • Hearing loss Sister    • Arrhythmia Sister         pacemaker implementation       Past Medical History:   Past Medical History:   Diagnosis Date   • Abnormal ECG    • Arrhythmia    • Atrial fibrillation    • CHF (congestive heart failure)    • Clotting disorder    • Depression    • Fracture of wrist why I'm here   • Heart valve disease    • HL (hearing loss)    • Mitral valve prolapse    • Sleep apnea    • Wrist sprain part of fracture       The following portions of the patient's history were reviewed and updated as appropriate: allergies, current medications, past family history, past medical history, past social history, past surgical history and problem list.    Review of Systems    Review of Systems   Constitutional:  Negative for chills and fever.   HENT:   Negative for congestion, rhinorrhea, sinus pain and sore throat.    Eyes:  Negative for photophobia and visual disturbance.   Respiratory:  Negative for cough, chest tightness and shortness of breath.    Cardiovascular:  Negative for chest pain and palpitations.   Gastrointestinal:  Negative for diarrhea, nausea and vomiting.   Genitourinary:  Negative for dysuria, frequency and urgency.   Skin:  Negative for rash and wound.   Neurological:  Negative for dizziness and syncope.   Psychiatric/Behavioral:  Negative for behavioral problems and confusion.        Objective   Physical Exam  Vitals and nursing note reviewed.   Constitutional:       Appearance: He is well-developed.   HENT:      Head: Normocephalic and atraumatic.      Right Ear: External ear normal.      Left Ear: External ear normal.   Cardiovascular:      Rate and Rhythm: Regular rhythm. Bradycardia present.      Heart sounds: Normal heart sounds.   Pulmonary:      Effort: Pulmonary effort is normal. No respiratory distress.      Breath sounds: Normal breath sounds.   Abdominal:      Palpations: Abdomen is soft.      Tenderness: There is no abdominal tenderness. There is no guarding.   Musculoskeletal:         General: Normal range of motion.      Cervical back: Normal range of motion and neck supple.   Lymphadenopathy:      Cervical: No cervical adenopathy.   Skin:     General: Skin is warm.   Neurological:      Mental Status: He is alert and oriented to person, place, and time.   Psychiatric:         Behavior: Behavior normal.       Vitals:    02/03/25 1415   BP: 102/68   Pulse: 50   Resp: 16   Temp: 98.1 °F (36.7 °C)   SpO2: 99%     Body mass index is 25.76 kg/m².    Medications:   Current Outpatient Medications:   •  ALPRAZolam (XANAX) 1 MG tablet, Take 1 tablet by mouth At Night As Needed for Anxiety., Disp: 90 tablet, Rfl: 1  •  apixaban (ELIQUIS) 5 MG tablet tablet, Take 1 tablet by mouth 2 (Two) Times a Day., Disp: 180 tablet, Rfl: 3  •  COLLAGEN  PO, Take  by mouth., Disp: , Rfl:   •  Flaxseed, Linseed, (FLAXSEED OIL PO), Take  by mouth., Disp: , Rfl:   •  metoprolol succinate XL (TOPROL-XL) 25 MG 24 hr tablet, Take 0.5 tablets by mouth Daily., Disp: 90 tablet, Rfl: 3  •  NON FORMULARY, Take 1 tablet by mouth 2 (Two) Times a Day. Vymada-----50 MG; patient is taking 100 mg daily (Patient taking differently: Take 1 tablet by mouth 2 (Two) Times a Day. Vymada-----50 MG;), Disp: , Rfl:   •  sacubitril-valsartan (Entresto) 24-26 MG tablet, Take 1 tablet by mouth 2 (Two) Times a Day., Disp: , Rfl:   •  spironolactone (Aldactone) 25 MG tablet, Take 1 tablet by mouth Daily., Disp: 90 tablet, Rfl: 3  •  tadalafil (Cialis) 5 MG tablet, Take 1 tablet by mouth Daily As Needed for Erectile Dysfunction., Disp: , Rfl:   •  tamsulosin (FLOMAX) 0.4 MG capsule 24 hr capsule, Take 1 capsule by mouth Daily., Disp: 30 capsule, Rfl: 6       Assessment & Plan   Healthy male exam.      1. Healthcare Maintenance:  2. Patient Counseling:  --Nutrition: Stressed importance of moderation in sodium/caffeine intake, saturated fat and cholesterol, caloric balance, sufficient intake of fresh fruits, vegetables, fiber, calcium and vit D  --Exercise: Recommended 30 minutes of exercise daily.   --Immunizations reviewed.  --Discussed benefits of screening colonoscopy.    Diagnoses and all orders for this visit:    Congestive heart failure, unspecified HF chronicity, unspecified heart failure type  -     CBC & Differential  -     Comprehensive Metabolic Panel    Elevated PSA    Bradycardia    Essential hypertension  -     metoprolol succinate XL (TOPROL-XL) 25 MG 24 hr tablet; Take 0.5 tablets by mouth Daily.  -     spironolactone (Aldactone) 25 MG tablet; Take 1 tablet by mouth Daily.        No follow-ups on file.           Dictated utilizing Dragon Voice Recognition Software

## 2025-02-08 NOTE — PROGRESS NOTES
Subjective   The ABCs of the Annual Wellness Visit  Medicare Wellness Visit      Dat Menjivar is a 84 y.o. patient who presents for a Medicare Wellness Visit.    The following portions of the patient's history were reviewed and   updated as appropriate: allergies, current medications, past family history, past medical history, past social history, past surgical history, and problem list.    Compared to one year ago, the patient's physical   health is the same.  Compared to one year ago, the patient's mental   health is the same.    Recent Hospitalizations:  He was not admitted to the hospital during the last year.     Current Medical Providers:  Patient Care Team:  Flaco Wylie MD as PCP - General (Family Medicine)    Outpatient Medications Prior to Visit   Medication Sig Dispense Refill    ALPRAZolam (XANAX) 1 MG tablet Take 1 tablet by mouth At Night As Needed for Anxiety. 90 tablet 1    apixaban (ELIQUIS) 5 MG tablet tablet Take 1 tablet by mouth 2 (Two) Times a Day. 180 tablet 3    COLLAGEN PO Take  by mouth.      Flaxseed, Linseed, (FLAXSEED OIL PO) Take  by mouth.      NON FORMULARY Take 1 tablet by mouth 2 (Two) Times a Day. Vymada-----50 MG; patient is taking 100 mg daily (Patient taking differently: Take 1 tablet by mouth 2 (Two) Times a Day. Vymada-----50 MG;)      sacubitril-valsartan (Entresto) 24-26 MG tablet Take 1 tablet by mouth 2 (Two) Times a Day.      tadalafil (Cialis) 5 MG tablet Take 1 tablet by mouth Daily As Needed for Erectile Dysfunction.      tamsulosin (FLOMAX) 0.4 MG capsule 24 hr capsule Take 1 capsule by mouth Daily. 30 capsule 6    metoprolol succinate XL (TOPROL-XL) 25 MG 24 hr tablet Take 1 tablet by mouth Daily. 90 tablet 3    spironolactone (Aldactone) 25 MG tablet Take 1 tablet by mouth Daily. 90 tablet 3     No facility-administered medications prior to visit.     No opioid medication identified on active medication list. I have reviewed chart for other potential   "high risk medication/s and harmful drug interactions in the elderly.      Aspirin is not on active medication list.  Aspirin use is not indicated based on review of current medical condition/s. Risk of harm outweighs potential benefits.  .    Patient Active Problem List   Diagnosis    Atrial fibrillation    Ascending aortic aneurysm    Chronic anticoagulation    Abnormal stress test    Cardiomyopathy     Advance Care Planning Advance Directive is on file.  ACP discussion was held with the patient during this visit. Patient has an advance directive in EMR which is still valid.             Objective   Vitals:    25 1415   BP: 102/68   BP Location: Left arm   Patient Position: Sitting   Pulse: 50   Resp: 16   Temp: 98.1 °F (36.7 °C)   TempSrc: Oral   SpO2: 99%   Weight: 86.2 kg (190 lb)   Height: 182.9 cm (72.01\")   PainSc: 0-No pain       Estimated body mass index is 25.76 kg/m² as calculated from the following:    Height as of this encounter: 182.9 cm (72.01\").    Weight as of this encounter: 86.2 kg (190 lb).                Does the patient have evidence of cognitive impairment? No                                                                                               Health  Risk Assessment    Smoking Status:  Social History     Tobacco Use   Smoking Status Former    Current packs/day: 0.00    Average packs/day: 0.3 packs/day for 5.0 years (1.3 ttl pk-yrs)    Types: Cigarettes, Cigars    Start date: 1960    Quit date: 1964    Years since quittin.1    Passive exposure: Past   Smokeless Tobacco Never   Tobacco Comments    not since      Alcohol Consumption:  Social History     Substance and Sexual Activity   Alcohol Use Yes    Alcohol/week: 4.0 standard drinks of alcohol    Types: 2 Glasses of wine, 2 Drinks containing 0.5 oz of alcohol per week    Comment: couple of gls wine per week       Fall Risk Screen  STEADI Fall Risk Assessment was completed, and patient is at LOW risk for " falls.Assessment completed on:2/3/2025    Depression Screening   Little interest or pleasure in doing things? Not at all   Feeling down, depressed, or hopeless? Not at all   PHQ-2 Total Score 0      Health Habits and Functional and Cognitive Screenin/3/2025     2:23 PM   Functional & Cognitive Status   Do you have difficulty preparing food and eating? No   Do you have difficulty bathing yourself, getting dressed or grooming yourself? No   Do you have difficulty using the toilet? No   Do you have difficulty moving around from place to place? No   Do you have trouble with steps or getting out of a bed or a chair? No   Current Diet Well Balanced Diet   Dental Exam Up to date   Eye Exam Up to date   Exercise (times per week) 6 times per week   Current Exercises Include Home Fitness Gym;Bicycling Outdoors;Home Exercise Program (TV, Computer, Etc.);Light Weights;Weightlifting   Do you need help using the phone?  No   Are you deaf or do you have serious difficulty hearing?  Yes   Do you need help to go to places out of walking distance? No   Do you need help shopping? No   Do you need help preparing meals?  No   Do you need help with housework?  No   Do you need help with laundry? No   Do you need help taking your medications? No   Do you need help managing money? No   Do you ever drive or ride in a car without wearing a seat belt? No   Have you felt unusual stress, anger or loneliness in the last month? No   Who do you live with? Other   If you need help, do you have trouble finding someone available to you? No   Have you been bothered in the last four weeks by sexual problems? No   Do you have difficulty concentrating, remembering or making decisions? No           Age-appropriate Screening Schedule:  Refer to the list below for future screening recommendations based on patient's age, sex and/or medical conditions. Orders for these recommended tests are listed in the plan section. The patient has been provided  with a written plan.    Health Maintenance List  Health Maintenance   Topic Date Due    Pneumococcal Vaccine 65+ (1 of 2 - PCV) Never done    TDAP/TD VACCINES (1 - Tdap) Never done    ZOSTER VACCINE (1 of 2) Never done    RSV Vaccine - Adults (1 - 1-dose 75+ series) Never done    INFLUENZA VACCINE  Never done    COVID-19 Vaccine (2 - 2024-25 season) 09/01/2024    ANNUAL WELLNESS VISIT  07/31/2025    BMI FOLLOWUP  07/31/2025                                                                                                                                                CMS Preventative Services Quick Reference  Risk Factors Identified During Encounter  None Identified    The above risks/problems have been discussed with the patient.  Pertinent information has been shared with the patient in the After Visit Summary.  An After Visit Summary and PPPS were made available to the patient.    Follow Up:   Next Medicare Wellness visit to be scheduled in 1 year.     Assessment & Plan  Congestive heart failure, unspecified HF chronicity, unspecified heart failure type          Orders:    CBC & Differential    Comprehensive Metabolic Panel    Elevated PSA         Bradycardia         Essential hypertension      Orders:    metoprolol succinate XL (TOPROL-XL) 25 MG 24 hr tablet; Take 0.5 tablets by mouth Daily.    spironolactone (Aldactone) 25 MG tablet; Take 1 tablet by mouth Daily.         Follow Up:   No follow-ups on file.

## 2025-03-04 DIAGNOSIS — F51.01 PRIMARY INSOMNIA: ICD-10-CM

## 2025-03-04 DIAGNOSIS — F41.9 ANXIETY: ICD-10-CM

## 2025-03-04 NOTE — TELEPHONE ENCOUNTER
Caller: Dat Menjivar    Relationship: Self    Best call back number: [phone]     Requested Prescriptions:   Requested Prescriptions     Pending Prescriptions Disp Refills    ALPRAZolam (XANAX) 1 MG tablet 90 tablet 1     Sig: Take 1 tablet by mouth At Night As Needed for Anxiety.        Pharmacy where request should be sent: [facility] - [address] - [address] AT [address] TO [facility] - [phone]  - [phone] FX     Last office visit with prescribing clinician: 2/3/2025   Last telemedicine visit with prescribing clinician: Visit date not found   Next office visit with prescribing clinician: Visit date not found     Additional details provided by patient:     Does the patient have less than a 3 day supply:  [] Yes  [x] No    Would you like a call back once the refill request has been completed: [] Yes [] No    If the office needs to give you a call back, can they leave a voicemail: [] Yes [] No    Lalita Alcala Rep   03/04/25 12:02 EST

## 2025-03-07 RX ORDER — ALPRAZOLAM 1 MG/1
1 TABLET ORAL NIGHTLY PRN
Qty: 90 TABLET | Refills: 1 | Status: SHIPPED | OUTPATIENT
Start: 2025-03-07

## 2025-04-14 ENCOUNTER — TELEPHONE (OUTPATIENT)
Dept: INTERNAL MEDICINE | Facility: CLINIC | Age: 85
End: 2025-04-14

## 2025-04-14 DIAGNOSIS — I10 ESSENTIAL HYPERTENSION: ICD-10-CM

## 2025-04-14 RX ORDER — TADALAFIL 5 MG/1
5 TABLET ORAL DAILY PRN
Qty: 30 TABLET | Refills: 5 | Status: SHIPPED | OUTPATIENT
Start: 2025-04-14

## 2025-04-14 RX ORDER — SPIRONOLACTONE 25 MG/1
25 TABLET ORAL DAILY
Qty: 90 TABLET | Refills: 0 | Status: SHIPPED | OUTPATIENT
Start: 2025-04-14

## 2025-04-14 NOTE — TELEPHONE ENCOUNTER
Patient informed via detailed voicemail that these medications have been sent to the pharmacy in [place].

## 2025-04-14 NOTE — TELEPHONE ENCOUNTER
Caller: Dat Menjivar    Relationship: Self    Best call back number:     [phone]       Which medication are you concerned about: spironolactone (Aldactone) 25 MG tablet   tadalafil (Cialis) 5 MG tablet   Who prescribed you this medication: DR ORLANDO        What are your concerns: PATIENT IS CALLING BACK TO SPEAK WITH [employer].  PATIENT STATES HE IS OUT OF THE COUNTRY AND WHEN HE IS IN TOWN HE STAYS WITH HIS SON.  BUT HE STATES HE IS GOING TO HIS SISTER IN [place] NEXT WEEKEND, AND SHE IS GOING TO  HIS MEDICATIONS NEXT WEEKEND WHEN HE IS AT HER HOUSE.           [id number] - [place], [place] - [address] - [phone] [facility] [phone]  [phone]     UNABLE TO WARM TRANSFER.    PLEASE ADVISE.

## 2025-04-15 ENCOUNTER — PRIOR AUTHORIZATION (OUTPATIENT)
Dept: INTERNAL MEDICINE | Facility: CLINIC | Age: 85
End: 2025-04-15
Payer: MEDICARE

## 2025-04-15 ENCOUNTER — TELEPHONE (OUTPATIENT)
Dept: INTERNAL MEDICINE | Facility: CLINIC | Age: 85
End: 2025-04-15
Payer: MEDICARE

## 2025-04-15 ENCOUNTER — TELEPHONE (OUTPATIENT)
Dept: INTERNAL MEDICINE | Facility: CLINIC | Age: 85
End: 2025-04-15

## 2025-04-15 NOTE — TELEPHONE ENCOUNTER
Caller: Dat Menjivar    Relationship to patient: Self    Patient is needing: PATIENT STATES THAT THE [facility] REQUIRES A PRIOR AUTHORIZATION.  PATIENT STATES THAT HE NEEDS THIS DONE AS HE IS OUT OF THE COUNTRY AND HIS SISTER IN [place] WILL BE PICKING IT UP AND TAKING IT TO THE PATIENT THIS WEEKEND.     PATIENT IS ASKING TO PLEASE INITIATE THE P.A. SO THAT HE CAN GET THIS MEDICATION BY THIS WEEKEND.

## 2025-04-15 NOTE — TELEPHONE ENCOUNTER
PA done on cover my meds, waiting on response. Patient informed I will call pharmacy once we get a response.

## 2025-04-15 NOTE — TELEPHONE ENCOUNTER
Caller: Dat Menjivar    Relationship: Self    Best call back number: [phone]     What is the best time to reach you: ANYTIME    Who are you requesting to speak with (clinical staff, provider,  specific staff member): CLINICAL    Do you know the name of the person who called: PENG    What was the call regarding: PATIENT STATED THAT THE PA WAS DENIED BECAUSE IT WAS FILLED OUT INCORRECTLY.    PA WAS DENIED BECAUSE THE PRIOR AUTHORIZATION WAS FILLED OUT FOR ONLY ERECTILE DYSFUNCTION.    THE PA NEEDS TO BE RESUBMITTED WITH THE DIAGNOSIS FOR BLOOD PRESSURE TREATMENT AND NOT ERECTILE DYSFUNCTION.    PATIENT NEEDS A 90 DAY PRESCRIPTION CALLED IN BECAUSE HE SAYS OUT OF THE COUNTRY FOR 3 MONTHS AT A TIME      PHONE NUMBER [phone]   CASE # [id number]    Is it okay if the provider responds through [employer]:  OKAY TO RESPOND THROUGH [employer]. PATIENT WILL NOT BE AVAILABLE AT THE NUMBER ABOVE

## 2025-04-15 NOTE — TELEPHONE ENCOUNTER
Hub staff attempted to follow warm transfer process and was unsuccessful     Caller: Dat Menjivar    Relationship to patient: Self    Best call back number: [phone] (PATIENT STATED HE WAS OUT OF THE COUNTRY AND COULD NOT BE REACHED BY PHONE)    Patient is needing: PATIENT WAS TRYING TO SPEAK TO MARIA ELENA REGARDING THE PA. HE STATES THAT HE WILL CALL HIS CARDIOLOGIST AND HAVE THEM FILL IT INSTEAD BECAUSE THEY FILLED IT INITIALLY. HE WANTED TO CANCEL THE RX. tadalafil (Cialis) 5 MG tablet

## 2025-06-04 ENCOUNTER — TELEPHONE (OUTPATIENT)
Dept: INTERNAL MEDICINE | Facility: CLINIC | Age: 85
End: 2025-06-04

## 2025-06-04 NOTE — TELEPHONE ENCOUNTER
Caller: Dat Menjivar    Relationship to patient: Self    Best call back number: [phone]    Patient is needing:   PATIENT IS CALLING IN WANTING TO SPEAK WITH SOMEONE REGARDING AN APPOINTMENT.     PATIENT LIVES OUT OF THE COUNTRY SO HE HAS SPECIFIC DATES AND TIMES HE COULD DO BUT THEY DO NOT ADD UP WITH HIS AVAILABILITY.     HE CANNOT ANSWER THE PHONE CALLS SO HE IS REQUESTING TO ONLY RESPOND VIA [employer].     PATIENT WILL BE IN TOWN ON 6.16.25 THROUGH 6.20.25.     PLEASE ADVISE.

## 2025-06-05 NOTE — TELEPHONE ENCOUNTER
PATIENT IS CALLING TO CHECK ON THE STATUS OF THIS REQUEST. PLEASE ADVISE, PATIENT STATES THAT HE NEEDS TO MAKE AN APPOINTMENT ASAP.

## 2025-06-24 ENCOUNTER — OFFICE VISIT (OUTPATIENT)
Dept: INTERNAL MEDICINE | Facility: CLINIC | Age: 85
End: 2025-06-24
Payer: MEDICARE

## 2025-06-24 VITALS
WEIGHT: 195 LBS | OXYGEN SATURATION: 98 % | BODY MASS INDEX: 26.41 KG/M2 | RESPIRATION RATE: 16 BRPM | HEIGHT: 72 IN | HEART RATE: 62 BPM | SYSTOLIC BLOOD PRESSURE: 102 MMHG | DIASTOLIC BLOOD PRESSURE: 64 MMHG | TEMPERATURE: 97.9 F

## 2025-06-24 DIAGNOSIS — E78.5 DYSLIPIDEMIA: ICD-10-CM

## 2025-06-24 DIAGNOSIS — R79.89 LOW TESTOSTERONE: ICD-10-CM

## 2025-06-24 DIAGNOSIS — F51.01 PRIMARY INSOMNIA: ICD-10-CM

## 2025-06-24 DIAGNOSIS — F41.9 ANXIETY: Primary | ICD-10-CM

## 2025-06-24 DIAGNOSIS — R97.20 ELEVATED PSA: ICD-10-CM

## 2025-06-24 DIAGNOSIS — I50.9 CONGESTIVE HEART FAILURE, UNSPECIFIED HF CHRONICITY, UNSPECIFIED HEART FAILURE TYPE: ICD-10-CM

## 2025-06-24 DIAGNOSIS — I10 ESSENTIAL HYPERTENSION: ICD-10-CM

## 2025-06-24 DIAGNOSIS — F41.9 ANXIETY: ICD-10-CM

## 2025-06-24 RX ORDER — SPIRONOLACTONE 25 MG/1
25 TABLET ORAL DAILY
Qty: 90 TABLET | Refills: 3 | Status: SHIPPED | OUTPATIENT
Start: 2025-06-24

## 2025-06-24 RX ORDER — ALPRAZOLAM 1 MG/1
1 TABLET ORAL NIGHTLY PRN
Qty: 90 TABLET | Refills: 1 | Status: SHIPPED | OUTPATIENT
Start: 2025-06-24

## 2025-06-24 RX ORDER — TAMSULOSIN HYDROCHLORIDE 0.4 MG/1
1 CAPSULE ORAL DAILY
Qty: 90 CAPSULE | Refills: 3 | Status: SHIPPED | OUTPATIENT
Start: 2025-06-24

## 2025-06-24 RX ORDER — SACUBITRIL AND VALSARTAN 24; 26 MG/1; MG/1
1 TABLET, FILM COATED ORAL 2 TIMES DAILY
Qty: 90 TABLET | Refills: 3 | Status: SHIPPED | OUTPATIENT
Start: 2025-06-24

## 2025-06-24 RX ORDER — METOPROLOL SUCCINATE 25 MG/1
12.5 TABLET, EXTENDED RELEASE ORAL DAILY
Qty: 90 TABLET | Refills: 3 | Status: SHIPPED | OUTPATIENT
Start: 2025-06-24

## 2025-06-24 RX ORDER — TADALAFIL 5 MG/1
5 TABLET ORAL DAILY PRN
Qty: 30 TABLET | Refills: 5 | Status: SHIPPED | OUTPATIENT
Start: 2025-06-24

## 2025-06-25 ENCOUNTER — HOSPITAL ENCOUNTER (OUTPATIENT)
Dept: CARDIOLOGY | Facility: HOSPITAL | Age: 85
Discharge: HOME | End: 2025-06-25
Admitting: NURSE PRACTITIONER
Payer: MEDICARE

## 2025-06-25 ENCOUNTER — OFFICE VISIT (OUTPATIENT)
Dept: CARDIOLOGY | Age: 85
End: 2025-06-25
Payer: MEDICARE

## 2025-06-25 VITALS
DIASTOLIC BLOOD PRESSURE: 58 MMHG | WEIGHT: 197.2 LBS | SYSTOLIC BLOOD PRESSURE: 98 MMHG | HEART RATE: 71 BPM | HEIGHT: 72 IN | BODY MASS INDEX: 26.71 KG/M2 | OXYGEN SATURATION: 98 %

## 2025-06-25 DIAGNOSIS — I35.0 NONRHEUMATIC AORTIC VALVE STENOSIS: ICD-10-CM

## 2025-06-25 DIAGNOSIS — I25.10 NONOBSTRUCTIVE ATHEROSCLEROSIS OF CORONARY ARTERY: ICD-10-CM

## 2025-06-25 DIAGNOSIS — I71.21 ANEURYSM OF ASCENDING AORTA WITHOUT RUPTURE: ICD-10-CM

## 2025-06-25 DIAGNOSIS — I42.8 NONISCHEMIC CARDIOMYOPATHY: ICD-10-CM

## 2025-06-25 DIAGNOSIS — I35.1 NONRHEUMATIC AORTIC VALVE INSUFFICIENCY: ICD-10-CM

## 2025-06-25 DIAGNOSIS — I48.21 PERMANENT ATRIAL FIBRILLATION: Primary | Chronic | ICD-10-CM

## 2025-06-25 DIAGNOSIS — I77.810 AORTIC ROOT DILATION: ICD-10-CM

## 2025-06-25 DIAGNOSIS — R93.1 ABNORMAL ECHOCARDIOGRAM: ICD-10-CM

## 2025-06-25 LAB
ALBUMIN SERPL-MCNC: 4.1 G/DL (ref 3.7–4.7)
ALP SERPL-CCNC: 72 IU/L (ref 44–121)
ALT SERPL-CCNC: 16 IU/L (ref 0–44)
AORTIC ARCH: 3.1 CM
AORTIC DIMENSIONLESS INDEX: 0.2 (DI)
ASCENDING AORTA: 4.8 CM
AST SERPL-CCNC: 21 IU/L (ref 0–40)
AV MEAN PRESS GRAD SYS DOP V1V2: 40 MMHG
AV VMAX SYS DOP: 443.3 CM/SEC
BASOPHILS # BLD AUTO: 0 X10E3/UL (ref 0–0.2)
BASOPHILS NFR BLD AUTO: 1 %
BH CV ECHO LEFT VENTRICLE GLOBAL LONGITUDINAL STRAIN: -11.2 %
BH CV ECHO MEAS - ACS: 1.5 CM
BH CV ECHO MEAS - AO MAX PG: 78.6 MMHG
BH CV ECHO MEAS - AO ROOT DIAM: 4.7 CM
BH CV ECHO MEAS - AO V2 VTI: 104.4 CM
BH CV ECHO MEAS - AVA(I,D): 0.65 CM2
BH CV ECHO MEAS - EDV(CUBED): 185.2 ML
BH CV ECHO MEAS - EDV(MOD-SP2): 193 ML
BH CV ECHO MEAS - EDV(MOD-SP4): 200 ML
BH CV ECHO MEAS - EF(MOD-SP2): 49.2 %
BH CV ECHO MEAS - EF(MOD-SP4): 53 %
BH CV ECHO MEAS - ESV(CUBED): 122.9 ML
BH CV ECHO MEAS - ESV(MOD-SP2): 98 ML
BH CV ECHO MEAS - ESV(MOD-SP4): 94 ML
BH CV ECHO MEAS - FS: 12.8 %
BH CV ECHO MEAS - IVS/LVPW: 1.31 CM
BH CV ECHO MEAS - IVSD: 2.1 CM
BH CV ECHO MEAS - LAT PEAK E' VEL: 8.6 CM/SEC
BH CV ECHO MEAS - LV DIASTOLIC VOL/BSA (35-75): 94.5 CM2
BH CV ECHO MEAS - LV MASS(C)D: 537.6 GRAMS
BH CV ECHO MEAS - LV MAX PG: 2.7 MMHG
BH CV ECHO MEAS - LV MEAN PG: 2 MMHG
BH CV ECHO MEAS - LV SYSTOLIC VOL/BSA (12-30): 44.4 CM2
BH CV ECHO MEAS - LV V1 MAX: 82.5 CM/SEC
BH CV ECHO MEAS - LV V1 VTI: 20.5 CM
BH CV ECHO MEAS - LVIDD: 5.7 CM
BH CV ECHO MEAS - LVIDS: 5 CM
BH CV ECHO MEAS - LVOT AREA: 3.3 CM2
BH CV ECHO MEAS - LVOT DIAM: 2.05 CM
BH CV ECHO MEAS - LVPWD: 1.6 CM
BH CV ECHO MEAS - MED PEAK E' VEL: 5.1 CM/SEC
BH CV ECHO MEAS - MR MAX PG: 77.2 MMHG
BH CV ECHO MEAS - MR MAX VEL: 439.3 CM/SEC
BH CV ECHO MEAS - MV DEC TIME: 0.18 SEC
BH CV ECHO MEAS - MV E MAX VEL: 133.3 CM/SEC
BH CV ECHO MEAS - PA ACC TIME: 0.11 SEC
BH CV ECHO MEAS - PA V2 MAX: 82.4 CM/SEC
BH CV ECHO MEAS - RV MAX PG: 1.15 MMHG
BH CV ECHO MEAS - RV V1 MAX: 53.6 CM/SEC
BH CV ECHO MEAS - RV V1 VTI: 9.8 CM
BH CV ECHO MEAS - SUP REN AO DIAM: 2.2 CM
BH CV ECHO MEAS - SV(LVOT): 67.7 ML
BH CV ECHO MEAS - SV(MOD-SP2): 95 ML
BH CV ECHO MEAS - SV(MOD-SP4): 106 ML
BH CV ECHO MEAS - SVI(LVOT): 32 ML/M2
BH CV ECHO MEAS - SVI(MOD-SP2): 44.9 ML/M2
BH CV ECHO MEAS - SVI(MOD-SP4): 50.1 ML/M2
BH CV ECHO MEAS - TAPSE (>1.6): 1.37 CM
BH CV ECHO MEASUREMENTS AVERAGE E/E' RATIO: 19.46
BH CV XLRA - RV BASE: 2.9 CM
BH CV XLRA - RV LENGTH: 7 CM
BH CV XLRA - RV MID: 2.43 CM
BH CV XLRA - TDI S': 11 CM/SEC
BILIRUB SERPL-MCNC: 0.5 MG/DL (ref 0–1.2)
BUN SERPL-MCNC: 28 MG/DL (ref 8–27)
BUN/CREAT SERPL: 18 (ref 10–24)
CALCIUM SERPL-MCNC: 9.4 MG/DL (ref 8.6–10.2)
CHLORIDE SERPL-SCNC: 108 MMOL/L (ref 96–106)
CHOLEST SERPL-MCNC: 167 MG/DL (ref 100–199)
CO2 SERPL-SCNC: 17 MMOL/L (ref 20–29)
CREAT SERPL-MCNC: 1.55 MG/DL (ref 0.76–1.27)
EGFRCR SERPLBLD CKD-EPI 2021: 44 ML/MIN/1.73
EOSINOPHIL # BLD AUTO: 0.1 X10E3/UL (ref 0–0.4)
EOSINOPHIL NFR BLD AUTO: 2 %
ERYTHROCYTE [DISTWIDTH] IN BLOOD BY AUTOMATED COUNT: 13.2 % (ref 11.6–15.4)
GLOBULIN SER CALC-MCNC: 2.4 G/DL (ref 1.5–4.5)
GLUCOSE SERPL-MCNC: 82 MG/DL (ref 70–99)
HCT VFR BLD AUTO: 54.5 % (ref 37.5–51)
HDLC SERPL-MCNC: 47 MG/DL
HGB BLD-MCNC: 17.2 G/DL (ref 13–17.7)
IMM GRANULOCYTES # BLD AUTO: 0 X10E3/UL (ref 0–0.1)
IMM GRANULOCYTES NFR BLD AUTO: 0 %
LDLC SERPL CALC-MCNC: 100 MG/DL (ref 0–99)
LDLC/HDLC SERPL: 2.1 RATIO (ref 0–3.6)
LEFT ATRIUM VOLUME INDEX: 66.5 ML/M2
LV EF BIPLANE MOD: 50.6 %
LYMPHOCYTES # BLD AUTO: 1.8 X10E3/UL (ref 0.7–3.1)
LYMPHOCYTES NFR BLD AUTO: 27 %
MCH RBC QN AUTO: 31.7 PG (ref 26.6–33)
MCHC RBC AUTO-ENTMCNC: 31.6 G/DL (ref 31.5–35.7)
MCV RBC AUTO: 101 FL (ref 79–97)
MONOCYTES # BLD AUTO: 0.5 X10E3/UL (ref 0.1–0.9)
MONOCYTES NFR BLD AUTO: 8 %
NEUTROPHILS # BLD AUTO: 4.1 X10E3/UL (ref 1.4–7)
NEUTROPHILS NFR BLD AUTO: 62 %
PLATELET # BLD AUTO: 130 X10E3/UL (ref 150–450)
POTASSIUM SERPL-SCNC: 3.9 MMOL/L (ref 3.5–5.2)
PROT SERPL-MCNC: 6.5 G/DL (ref 6–8.5)
RBC # BLD AUTO: 5.42 X10E6/UL (ref 4.14–5.8)
SINUS: 4.8 CM
SODIUM SERPL-SCNC: 143 MMOL/L (ref 134–144)
STJ: 3.5 CM
TRIGL SERPL-MCNC: 112 MG/DL (ref 0–149)
VLDLC SERPL CALC-MCNC: 20 MG/DL (ref 5–40)
WBC # BLD AUTO: 6.6 X10E3/UL (ref 3.4–10.8)

## 2025-06-25 PROCEDURE — 93306 TTE W/DOPPLER COMPLETE: CPT

## 2025-06-25 PROCEDURE — 93356 MYOCRD STRAIN IMG SPCKL TRCK: CPT

## 2025-06-25 NOTE — PROGRESS NOTES
Date of Office Visit: 2025  Encounter Provider: STEVEN Taylor  Place of Service: [facility] CARDIOLOGY  Patient Name: Dat Menjivar  :1940  Primary Cardiologist: Dr. Tez Mark    Chief Complaint   Patient presents with    Follow-up    Aortic Stenosis    Aortic Insufficiency    Atrial Fibrillation    Cardiomyopathy   :     HPI: Dat Menjivar is a 85 y.o. male who presents today for 6-month cardiac follow-up visit.  He is a new patient to me and I have reviewed his medical records.    He lives in [facility] and comes to [place] every 3-4 months (son lives here).  He has been diagnosed with permanent atrial fibrillation, ascending aortic aneurysm, nonischemic cardiomyopathy, nonobstructive coronary atherosclerosis, and aortic valve disease.  He has pulmonary nodules followed by his PCP.    In 2024, Lexiscan myocardial perfusion study was abnormal.  Coronary angiography was normal, except for 30% mid LAD stenosis.    Last echocardiogram in 2025 showed LVEF 52%, mild LVH, biatrial dilation, moderate aortic regurgitation, moderate aortic stenosis, aortic root dilation (4.6 cm), mild dilation of the sinus of Valsalva, moderate dilation of the ascending aorta (4.8 cm).  Dr. Mark recommended rechecking echocardiogram in 6 months.    He presents today for a follow-up visit.  He works out a lot doing a spin bike 3 days a week and lifting weights the other 3 days a week.  He informs me that he takes Cialis daily for better systemic blood flow.  He reports a postnasal drip, cough in the morning, pedal edema, and occasional fluttering sensation.  He denies chest pain, shortness of air, dizziness, or syncope.  Blood pressure low today.  He remains in atrial fibrillation.      Past Medical History:   Diagnosis Date    Abnormal ECG     Aortic root dilation 2025    Atrial fibrillation     CHF (congestive heart failure)     Clotting disorder      Depression     Fracture of wrist why I'm here    Heart valve disease     HL (hearing loss)     Mitral valve prolapse     Nonobstructive atherosclerosis of coronary artery 2025    Nonrheumatic aortic valve insufficiency 2025    Nonrheumatic aortic valve stenosis 2025    Sleep apnea     Wrist sprain part of fracture       Past Surgical History:   Procedure Laterality Date    CARDIAC CATHETERIZATION N/A 10/23/2024    Procedure: Left Heart Cath;  Surgeon: Emmy Uribe MD;  Location:  [facility];  Service: Cardiovascular;  Laterality: N/A;    CARDIAC CATHETERIZATION N/A 10/23/2024    Procedure: Coronary angiography;  Surgeon: Emmy Uribe MD;  Location:  [facility];  Service: Cardiovascular;  Laterality: N/A;    CARDIAC CATHETERIZATION  10/23/2024    ELBOW PROCEDURE      EYE SURGERY Bilateral cataract    FRACTURE SURGERY  wrist //elbow    HERNIA REPAIR  50 years ago    HIP SURGERY Right     JOINT REPLACEMENT  right hip/left knee    KNEE SURGERY Left     PROSTATE SURGERY  TURP    SINUS SURGERY         Social History     Socioeconomic History    Marital status: Single    Number of children: 1   Tobacco Use    Smoking status: Former     Current packs/day: 0.00     Average packs/day: 0.3 packs/day for 6.0 years (1.6 ttl pk-yrs)     Types: Cigarettes, Cigars     Start date: 1960     Quit date: 1964     Years since quittin.5     Passive exposure: Past    Smokeless tobacco: Never    Tobacco comments:     not since    Vaping Use    Vaping status: Never Used   Substance and Sexual Activity    Alcohol use: Yes     Alcohol/week: 4.0 standard drinks of alcohol     Types: 2 Glasses of wine, 2 Drinks containing 0.5 oz of alcohol per week     Comment: couple of gls wine per week    Drug use: Not Currently     Types: Marijuana     Comment: occasionally    Sexual activity: Yes     Partners: Female     Birth control/protection: None       Family History    Problem Relation Age of Onset    Diabetes Sister     Arthritis Sister     Arthritis Sister     Diabetes Sister     Hearing loss Sister     Arrhythmia Sister         pacemaker implementation       The following portion of the patient's history were reviewed and updated as appropriate: past medical history, past surgical history, past social history, past family history, allergies, current medications, and problem list.    Review of Systems   Constitutional: Negative.   Cardiovascular:  Positive for leg swelling and palpitations.   Respiratory:  Positive for cough and wheezing.    Hematologic/Lymphatic: Bruises/bleeds easily.   Neurological:  Positive for excessive daytime sleepiness.       Allergies   Allergen Reactions    Latex Hives and Itching     Per pt         Current Outpatient Medications:     ALPRAZolam (XANAX) 1 MG tablet, Take 1 tablet by mouth At Night As Needed for Anxiety., Disp: 90 tablet, Rfl: 1    COLLAGEN PO, Take  by mouth., Disp: , Rfl:     Flaxseed, Linseed, (FLAXSEED OIL PO), Take  by mouth., Disp: , Rfl:     metoprolol succinate XL (TOPROL-XL) 25 MG 24 hr tablet, Take 0.5 tablets by mouth Daily., Disp: 90 tablet, Rfl: 3    NON FORMULARY, Take 1 tablet by mouth 2 (Two) Times a Day. Vymada-----50 MG; patient is taking 100 mg daily (Patient taking differently: Take 1 tablet by mouth 2 (Two) Times a Day. Vymada-----50 MG;), Disp: , Rfl:     rivaroxaban (XARELTO) 20 MG tablet, Take 1 tablet by mouth Daily With Dinner., Disp: , Rfl:     sacubitril-valsartan (Entresto) 24-26 MG tablet, Take 1 tablet by mouth 2 (Two) Times a Day., Disp: 90 tablet, Rfl: 3    spironolactone (Aldactone) 25 MG tablet, Take 1 tablet by mouth Daily. (Patient taking differently: Take 0.5 tablets by mouth 2 (Two) Times a Day.), Disp: 90 tablet, Rfl: 3    tadalafil (Cialis) 5 MG tablet, Take 1 tablet by mouth Daily As Needed for Erectile Dysfunction., Disp: 30 tablet, Rfl: 5    tamsulosin (FLOMAX) 0.4 MG capsule 24 hr capsule,  "Take 1 capsule by mouth Daily., Disp: 90 capsule, Rfl: 3         Objective:     Vitals:    06/25/25 1318   BP: 98/58   BP Location: Left arm   Patient Position: Sitting   Cuff Size: Adult   Pulse: 71   SpO2: 98%   Weight: 89.4 kg (197 lb 3.2 oz)   Height: 182.9 cm (72.01\")     Body mass index is 26.74 kg/m².    PHYSICAL EXAM:    Vitals Reviewed.   General Appearance: No acute distress, well developed and well nourished.   HENT: Wears hearing aids.  Respiratory: No signs of respiratory distress. Respiration rhythm and depth normal.  Clear to auscultation.   Cardiovascular:  Jugular Venous Pressure: Normal  Heart Rate and Rhythm: Irregularly, irregular.  Heart Sounds: Normal S1 and S2. No S3 or S4 noted.  Murmurs: RUSB grade 1/6 systolic murmur present.  Lower Extremities: Bilateral pedal edema noted.  Musculoskeletal: Normal movement of extremities.  Skin: General appearance normal.    Psychiatric: Patient alert and oriented to person, place, and time. Speech and behavior appropriate. Normal mood and affect.       ECG 12 Lead    Date/Time: 6/25/2025 1:09 PM  Performed by: Lianna Breaux APRN    Authorized by: Lianna Breaux APRN  Comparison: compared with previous ECG from 9/27/2024  Similar to previous ECG  Rhythm: atrial fibrillation  Rate: normal  BPM: 71  Conduction: conduction normal  ST Segments: ST segments normal  T Waves: T waves normal  QRS axis: normal    Clinical impression: abnormal EKG            Assessment:       Diagnosis Plan   1. Permanent atrial fibrillation        2. Nonischemic cardiomyopathy        3. Nonrheumatic aortic valve stenosis  Adult Transthoracic Echo Complete w/ Color, Spectral and Contrast if Necessary Per Protocol      4. Nonrheumatic aortic valve insufficiency        5. Aneurysm of ascending aorta without rupture        6. Aortic root dilation        7. Nonobstructive atherosclerosis of coronary artery               Plan:       1.  Permanent Atrial Fibrillation: He is rate " controlled on low-dose metoprolol succinate.  [id number] score of 2 and remains on rivaroxaban.  He reports an occasional brief fluttering sensation.  I cannot further titrate the metoprolol due to low blood pressure.    2.  History of nonischemic cardiomyopathy: LVEF has normalized.  Denies dyspnea.  Chronic pedal edema noted.  I am unable to further titrate his medications or add diuretics.  I recommended low-sodium diet, leg elevation, and compression stockings.    3/4.  Aortic Stenosis and Aortic Regurgitation: (Moderate in January 2025 on echocardiogram.  Dr. Mark recommended repeating echocardiogram in 6 months.  He will have the echo done this week.    5/6.  Ascending aorta dilation and aortic root dilation.  Remeasure on upcoming echocardiogram.    7.  Coronary Artery Disease: 30% mid LAD stenosis noted per heart catheterization in 2024.  Patient be on aspirin 81 mg daily and statin.  I will defer the statin to his PCP.    8.  Further recommendations to follow after echocardiogram is completed.    ADDENDUM 6/27/2025:  Echocardiogram showed progression of aortic stenosis to severe and possible cardiac amyloid.  When I saw him he was symptom-free except for pedal edema.  Dr. Mark reviewed and recommended referral to our structural heart team and PYP imaging.  Patient has been informed and verbalizes understanding.  He would like to have these appointments scheduled at the end of August.    As always, it has been a pleasure to participate in your patient's care. Thank you.         Sincerely,         STEVEN Strickland  [facility] Cardiology      Dictated utilizing Dragon Dictation

## 2025-06-26 PROBLEM — I35.1 NONRHEUMATIC AORTIC VALVE INSUFFICIENCY: Status: ACTIVE | Noted: 2025-06-26

## 2025-06-26 PROBLEM — Z79.01 CHRONIC ANTICOAGULATION: Status: RESOLVED | Noted: 2024-09-27 | Resolved: 2025-06-26

## 2025-06-26 PROBLEM — I77.810 AORTIC ROOT DILATION: Status: ACTIVE | Noted: 2025-06-26

## 2025-06-26 PROBLEM — I42.8 NONISCHEMIC CARDIOMYOPATHY: Status: ACTIVE | Noted: 2024-10-03

## 2025-06-26 PROBLEM — R94.39 ABNORMAL STRESS TEST: Status: RESOLVED | Noted: 2024-10-03 | Resolved: 2025-06-26

## 2025-06-26 PROBLEM — I35.0 NONRHEUMATIC AORTIC VALVE STENOSIS: Status: ACTIVE | Noted: 2025-06-26

## 2025-06-26 PROBLEM — I25.10 NONOBSTRUCTIVE ATHEROSCLEROSIS OF CORONARY ARTERY: Status: ACTIVE | Noted: 2025-06-26

## 2025-06-26 PROBLEM — I42.9 CARDIOMYOPATHY: Status: RESOLVED | Noted: 2024-10-03 | Resolved: 2025-06-26

## 2025-06-27 ENCOUNTER — TELEPHONE (OUTPATIENT)
Dept: CARDIOLOGY | Facility: HOSPITAL | Age: 85
End: 2025-06-27
Payer: MEDICARE

## 2025-06-27 NOTE — TELEPHONE ENCOUNTER
Dr Mark & Lianna HARRIS have referred Mr Menjivar for further evaluation of AS.  I called & spoke with Dat. He lives in [place], is here staying with his son currently, and plans to return to [place] this Sun 6/29/25.  We discussed AS & the evaluation process. He is interested in addressing this issue as efficiently as possible.   He will make plans to return in August in which we will coordinate his appts. Communication can be difficult while he is in [facility] but said he accesses his email regularly. He has requested the week of 8/26/25 for appts.  I have emailed him a shared decision-making tool. I've also requested appts with Dr Doty & Dr Cosby. Lianna is ordering a PYP which we will also coordinate for that week of his return.   I've provided the contact information of the coordinator's office & encouraged Mr Menjivar to reach out with any questions or concerns. RTRN

## 2025-06-29 NOTE — PROGRESS NOTES
Chief Complaint  Anxiety    Subjective          Dat Menjivar presents to [facility]  History of Present Illness  The patient, Mr. Dat Menjivar, came in for evaluation of his heart failure, prostate health, and aortic aneurysm. He has been seeing Dr. Mark for his congestive heart failure, which caused his heart's ejection fraction to drop from 55% to 31%. He followed advice to keep up with aerobic exercises, and his ejection fraction improved to 51%. Initially, he wasn't prescribed Entresto and metoprolol, but his doctor in [facility] started him on these medications. He currently takes metoprolol succinate XL 12.5 mg daily, Entresto 24-26 one pill twice a day, and spironolactone 25 mg daily. He also takes Xarelto. He feels more tired than he did a year or two ago but maintains a healthy diet and exercises regularly, including weightlifting every other day. His weight has stayed between 190 and 195 pounds. He doesn't smoke but drinks a couple of glasses of wine per week. He hasn't used marijuana in the past 5 years and has avoided cocaine for the past 30 to 40 years.    Regarding his prostate health, he was initially diagnosed with level 4 lesions that were likely cancerous. After following a regimen of diet, exercise, and medication, subsequent biopsies showed no evidence of cancer. His most recent PSA level was 3.4, and he reports no current bleeding from lesions.    He has a known mitral valve problem and a 5 cm aortic aneurysm, both of which have been stable for the past 50 years. He undergoes annual imaging to monitor these conditions.    He is currently taking Xanax 0.5 mg every third night and Cialis 5 mg as prescribed by his urologist.    SOCIAL HISTORY  He does not smoke. He drinks a couple of glasses of wine a week. He has not smoked marijuana in about 5 to 10 years and has not used cocaine for 30 to 40 years.    Objective   Vital Signs:   /64 (BP Location:  "Left arm, Patient Position: Sitting)   Pulse 62   Temp 97.9 °F (36.6 °C) (Oral)   Resp 16   Ht 182.9 cm (72.01\")   Wt 88.5 kg (195 lb)   SpO2 98%   BMI 26.44 kg/m²     Physical Exam  Vitals and nursing note reviewed.   Constitutional:       Appearance: He is well-developed.   HENT:      Head: Normocephalic and atraumatic.   Musculoskeletal:      Cervical back: Normal range of motion and neck supple.   Neurological:      Mental Status: He is alert and oriented to person, place, and time.   Psychiatric:         Behavior: Behavior normal.         Physical Exam  Extremities: Edema noted in the foot.     Result Review :                 Assessment and Plan    Diagnoses and all orders for this visit:    1. Anxiety (Primary)  -     ALPRAZolam (XANAX) 1 MG tablet; Take 1 tablet by mouth At Night As Needed for Anxiety.  Dispense: 90 tablet; Refill: 1    2. Primary insomnia  -     ALPRAZolam (XANAX) 1 MG tablet; Take 1 tablet by mouth At Night As Needed for Anxiety.  Dispense: 90 tablet; Refill: 1    3. Elevated PSA    4. Congestive heart failure, unspecified HF chronicity, unspecified heart failure type    5. Essential hypertension  -     CBC & Differential  -     Comprehensive Metabolic Panel    6. Dyslipidemia  -     Lipid Panel With LDL / HDL Ratio    7. Low testosterone  -     Testosterone,Free+Weakly Bound    Other orders  -     tadalafil (Cialis) 5 MG tablet; Take 1 tablet by mouth Daily As Needed for Erectile Dysfunction.  Dispense: 30 tablet; Refill: 5      Assessment & Plan  1. Congestive Heart Failure.  - Ejection fraction has improved to 51% with the current medication regimen.  - Current medications include Entresto 24-26 mg (one pill twice a day), metoprolol succinate XL 12.5 mg daily, and spironolactone 25 mg daily.  - Reports feeling tired, possibly related to low blood pressure readings (e.g., 102/64 mmHg).  - Advised to discuss with cardiologist the possibility of reducing spironolactone dosage and " adding Jardiance; continue aerobic exercise program as recommended.    2. Prostate Cancer.  - Previous lesions have resolved; recent biopsy showed no signs of cancer.  - PSA level last week was 3.4 ng/mL, within normal limits.  - No further imaging required at this time.  - Continue current regimen of diet, exercise, and medication as advised by urologist.    3. Aortic Aneurysm.  - Stable 5 cm aortic aneurysm, unchanged for the past 50 years.  - Undergoes regular imaging every year to year and a half to monitor the aneurysm.  - No intervention required at this time; blood vessels are pliable and no need for a stent.    Follow Up   No follow-ups on file.  Patient was given instructions and counseling regarding his condition or for health maintenance advice. Please see specific information pulled into the AVS if appropriate.           Patient or patient representative verbalized consent for the use of Ambient Listening during the visit with  Flaco Wylie MD for chart documentation. 6/29/2025  11:03 EDT

## 2025-06-30 LAB
TESTOST SERPL-MCNC: 596 NG/DL (ref 264–916)
TESTOSTERONE.FREE+WB MFR SERPL: 15.4 % (ref 9–46)
TESTOSTERONE.FREE+WB SERPL-MCNC: 91.8 NG/DL (ref 40–250)

## 2025-07-01 DIAGNOSIS — I35.0 NONRHEUMATIC AORTIC VALVE STENOSIS: Primary | ICD-10-CM

## 2025-07-10 DIAGNOSIS — I10 ESSENTIAL HYPERTENSION: ICD-10-CM

## 2025-07-10 RX ORDER — SPIRONOLACTONE 25 MG/1
25 TABLET ORAL DAILY
Qty: 90 TABLET | Refills: 3 | Status: SHIPPED | OUTPATIENT
Start: 2025-07-10

## 2025-07-28 ENCOUNTER — TELEPHONE (OUTPATIENT)
Dept: CARDIOLOGY | Age: 85
End: 2025-07-28
Payer: MEDICARE

## 2025-07-29 ENCOUNTER — OFFICE VISIT (OUTPATIENT)
Dept: CARDIAC SURGERY | Facility: CLINIC | Age: 85
End: 2025-07-29
Payer: MEDICARE

## 2025-07-29 ENCOUNTER — HOSPITAL ENCOUNTER (OUTPATIENT)
Dept: CARDIOLOGY | Facility: HOSPITAL | Age: 85
Discharge: HOME | End: 2025-07-29
Admitting: NURSE PRACTITIONER
Payer: MEDICARE

## 2025-07-29 VITALS
SYSTOLIC BLOOD PRESSURE: 99 MMHG | HEIGHT: 73 IN | WEIGHT: 190 LBS | BODY MASS INDEX: 25.18 KG/M2 | OXYGEN SATURATION: 98 % | TEMPERATURE: 97.1 F | HEART RATE: 86 BPM | DIASTOLIC BLOOD PRESSURE: 61 MMHG | RESPIRATION RATE: 18 BRPM

## 2025-07-29 VITALS — HEIGHT: 73 IN | BODY MASS INDEX: 25.42 KG/M2 | WEIGHT: 191.8 LBS

## 2025-07-29 DIAGNOSIS — I42.8 NONISCHEMIC CARDIOMYOPATHY: ICD-10-CM

## 2025-07-29 DIAGNOSIS — L98.9 SKIN LESIONS: Primary | ICD-10-CM

## 2025-07-29 DIAGNOSIS — R93.1 ABNORMAL ECHOCARDIOGRAM: ICD-10-CM

## 2025-07-29 DIAGNOSIS — I77.810 AORTIC ROOT DILATION: Primary | ICD-10-CM

## 2025-07-29 DIAGNOSIS — I35.0 NONRHEUMATIC AORTIC VALVE STENOSIS: ICD-10-CM

## 2025-07-29 DIAGNOSIS — I48.21 PERMANENT ATRIAL FIBRILLATION: Chronic | ICD-10-CM

## 2025-07-29 PROCEDURE — 1160F RVW MEDS BY RX/DR IN RCRD: CPT | Performed by: THORACIC SURGERY (CARDIOTHORACIC VASCULAR SURGERY)

## 2025-07-29 PROCEDURE — 99204 OFFICE O/P NEW MOD 45 MIN: CPT | Performed by: THORACIC SURGERY (CARDIOTHORACIC VASCULAR SURGERY)

## 2025-07-29 PROCEDURE — 1159F MED LIST DOCD IN RCRD: CPT | Performed by: THORACIC SURGERY (CARDIOTHORACIC VASCULAR SURGERY)

## 2025-07-29 PROCEDURE — A9538 TC99M PYROPHOSPHATE: HCPCS | Performed by: NURSE PRACTITIONER

## 2025-07-29 PROCEDURE — 78803 RP LOCLZJ TUM SPECT 1 AREA: CPT

## 2025-07-29 PROCEDURE — 34310000005 TECHNETIUM TC99M PYROPHOSPHATE: Performed by: NURSE PRACTITIONER

## 2025-07-29 RX ORDER — VIT C/B6/B5/MAGNESIUM/HERB 173 50-5-6-5MG
6000 CAPSULE ORAL DAILY
COMMUNITY

## 2025-07-29 RX ORDER — LANOLIN ALCOHOL/MO/W.PET/CERES
1000 CREAM (GRAM) TOPICAL DAILY
COMMUNITY

## 2025-07-29 RX ORDER — VITAMIN E (DL,TOCOPHERYL ACET) 45 MG/0.25
DROPS ORAL
COMMUNITY

## 2025-07-29 RX ORDER — SODIUM PHOSPHATE,MONO-DIBASIC 19G-7G/118
ENEMA (ML) RECTAL
COMMUNITY

## 2025-07-29 RX ORDER — UBIDECARENONE 100 MG
100 CAPSULE ORAL DAILY
COMMUNITY

## 2025-07-29 RX ORDER — MILK THISTLE 150 MG
2000 CAPSULE ORAL DAILY
COMMUNITY

## 2025-07-29 RX ORDER — SELENIUM 200 MCG
200 TABLET ORAL DAILY
COMMUNITY

## 2025-07-29 RX ORDER — ARGININE HCL 1000 MG
1000 TABLET ORAL DAILY
COMMUNITY

## 2025-07-29 RX ADMIN — TECHNETIUM TC99M PYROPHOSPHATE 1 DOSE: 12 INJECTION INTRAVENOUS at 11:00

## 2025-07-29 NOTE — LETTER
July 29, 2025     Flaco Wylie MD  [address] [facility]  [address]  [place] [id number]    Patient: Dat Menjivar   YOB: 1940   Date of Visit: 7/29/2025     Dear Flaco Wylie MD:       Thank you for referring Dat Menjivar to me for evaluation. Below are the relevant portions of my assessment and plan of care.    If you have questions, please do not hesitate to call me. I look forward to following Dat along with you.         Sincerely,        Maurice Cosby MD        CC: MD Alea Lafleur Sebastian, MD  07/29/25 2028  Sign when Signing Visit  7/29/2025      Chief Complaint     Fatigue, evaluation of aortic stenosis    History of Present Illness:       Dear Tez and Colleagues,  It was nice to see Dat Menjivar in consultation at your request. He is a 85 y.o. male with a history of atrial fibrillation, nonischemic cardiomyopathy nonobstructive coronary artery disease, pulmonary nodules who has developed fatigue and shortness of breath while performing the usual exercise routine.  He is very active for his age and he spin bike 3 days a week and lifting weights 3 days a week.  He lives in [place] and he surfs intermittently.  He is very well fit for his age.  He recently has developed fatigue and some shortness of breath while lifting weights and exercising.. He denies syncope, TIA, orthopnea, PND or lower extremity edema.  He had an echocardiogram performed in June 2025 that showed an ejection fraction of 50%, moderate aortic valve regurgitation and severe aortic stenosis with a valve area of 0.65 cm squire, peak velocity of 4.4 m/s, peak gradient of 78 mmHg, mean gradient of 40 mmHg and aortic valve dimensionless index of 0.20.  He has mild mitral valve regurgitation.  He also showed moderate dilatation of the aortic root at 4.7 cm.  A previous echocardiogram in January 2025 show dilatation of the aortic root at 4.6 cm and the ascending aorta 4.8 cm.  He has  no family history of aneurysms, dissections or connective tissue disorders.  He came to [place] to have his medical evaluation and he wishes to be treated in the area.  He has consulted online to other places.  He hopes a TAVR is feasible.      Patient Active Problem List   Diagnosis   • Atrial fibrillation   • Ascending aortic aneurysm   • Nonischemic cardiomyopathy   • Nonobstructive atherosclerosis of coronary artery   • Nonrheumatic aortic valve stenosis   • Nonrheumatic aortic valve insufficiency   • Aortic root dilation       Past Medical History:   Diagnosis Date   • Abnormal ECG    • Aortic root dilation 06/26/2025   • Atrial fibrillation    • CHF (congestive heart failure)    • Clotting disorder    • Depression    • Fracture of wrist why I'm here   • Heart valve disease    • HL (hearing loss)    • Mitral valve prolapse    • Nonobstructive atherosclerosis of coronary artery 06/26/2025   • Nonrheumatic aortic valve insufficiency 06/26/2025   • Nonrheumatic aortic valve stenosis 06/26/2025   • Sleep apnea    • Wrist sprain part of fracture       Past Surgical History:   Procedure Laterality Date   • CARDIAC CATHETERIZATION N/A 10/23/2024    Procedure: Left Heart Cath;  Surgeon: Emmy Uribe MD;  Location: [facility] CATH INVASIVE LOCATION;  Service: Cardiovascular;  Laterality: N/A;   • CARDIAC CATHETERIZATION N/A 10/23/2024    Procedure: Coronary angiography;  Surgeon: Emmy Uribe MD;  Location:  [facility] CATH INVASIVE LOCATION;  Service: Cardiovascular;  Laterality: N/A;   • CARDIAC CATHETERIZATION  10/23/2024   • ELBOW PROCEDURE     • EYE SURGERY Bilateral cataract   • FRACTURE SURGERY  wrist //elbow   • HERNIA REPAIR  50 years ago   • HIP SURGERY Right    • JOINT REPLACEMENT  right hip/left knee   • KNEE SURGERY Left    • PROSTATE SURGERY  TURP   • SINUS SURGERY         Allergies   Allergen Reactions   • Latex Hives and Itching     Per pt         Current Outpatient Medications:   •  ALPRAZolam (XANAX) 1  MG tablet, Take 1 tablet by mouth At Night As Needed for Anxiety., Disp: 90 tablet, Rfl: 1  •  coenzyme Q10 100 MG capsule, Take 1 capsule by mouth Daily., Disp: , Rfl:   •  COLLAGEN PO, Take  by mouth., Disp: , Rfl:   •  Flaxseed, Linseed, (FLAXSEED OIL PO), Take  by mouth., Disp: , Rfl:   •  glucosamine-chondroitin 500-400 MG capsule capsule, Take  by mouth 3 (Three) Times a Day With Meals., Disp: , Rfl:   •  L-Arginine 1000 MG tablet, Take 1,000 mg by mouth Daily., Disp: , Rfl:   •  LINOLEIC ACID-SUNFLOWER OIL PO, Take 1,200 mg by mouth Daily., Disp: , Rfl:   •  metoprolol succinate XL (TOPROL-XL) 25 MG 24 hr tablet, Take 0.5 tablets by mouth Daily., Disp: 90 tablet, Rfl: 3  •  Misc Natural Products (RESVERATIN PLUS PO), Take  by mouth., Disp: , Rfl:   •  NON FORMULARY, Take 1 tablet by mouth 2 (Two) Times a Day. Vymada-----50 MG; patient is taking 100 mg daily (Patient taking differently: Take 1 tablet by mouth 2 (Two) Times a Day. Vymada-----50 MG;), Disp: , Rfl:   •  Prasterone, DHEA, (DHEA 50 PO), Take 50 mg by mouth Daily., Disp: , Rfl:   •  Probiotic Product (Probiotic 10 Ultra Strength) capsule, Take  by mouth., Disp: , Rfl:   •  Quercetin 500 MG capsule, Take 4 capsules by mouth Daily., Disp: , Rfl:   •  rivaroxaban (XARELTO) 20 MG tablet, Take 1 tablet by mouth Daily With Dinner., Disp: , Rfl:   •  sacubitril-valsartan (Entresto) 24-26 MG tablet, Take 1 tablet by mouth 2 (Two) Times a Day., Disp: 90 tablet, Rfl: 3  •  Selenium 200 MCG tablet, Take 200 mcg by mouth Daily., Disp: , Rfl:   •  spironolactone (ALDACTONE) 25 MG tablet, TAKE 1 TABLET BY MOUTH EVERY DAY, Disp: 90 tablet, Rfl: 3  •  tadalafil (Cialis) 5 MG tablet, Take 1 tablet by mouth Daily As Needed for Erectile Dysfunction., Disp: 30 tablet, Rfl: 5  •  tamsulosin (FLOMAX) 0.4 MG capsule 24 hr capsule, Take 1 capsule by mouth Daily., Disp: 90 capsule, Rfl: 3  •  Turmeric (QC Tumeric Complex) 500 MG capsule, Take 12 capsules by mouth Daily.,  Disp: , Rfl:   •  vitamin B-12 (CYANOCOBALAMIN) 1000 MCG tablet, Take 1 tablet by mouth Daily., Disp: , Rfl:   No current facility-administered medications for this visit.    Social History     Socioeconomic History   • Marital status: Single   • Number of children: 1   Tobacco Use   • Smoking status: Former     Current packs/day: 0.00     Average packs/day: 0.3 packs/day for 6.0 years (1.6 ttl pk-yrs)     Types: Cigarettes, Cigars     Start date: 1960     Quit date: 1964     Years since quittin.6     Passive exposure: Past   • Smokeless tobacco: Never   • Tobacco comments:     not since    Vaping Use   • Vaping status: Never Used   Substance and Sexual Activity   • Alcohol use: Yes     Alcohol/week: 4.0 standard drinks of alcohol     Types: 2 Glasses of wine, 2 Drinks containing 0.5 oz of alcohol per week     Comment: couple of gls wine per week   • Drug use: Not Currently     Types: Marijuana     Comment: occasionally   • Sexual activity: Yes     Partners: Female     Birth control/protection: None       Family History   Problem Relation Age of Onset   • Diabetes Sister    • Arthritis Sister    • Arthritis Sister    • Diabetes Sister    • Hearing loss Sister    • Arrhythmia Sister         pacemaker implementation     Review of Systems:  As HPI, otherwise noncontributory    Physical Exam:    Vital Signs:  Weight: 86.2 kg (190 lb)   Body mass index is 25.07 kg/m².  Temp: 97.1 °F (36.2 °C)   Heart Rate: 86   BP: 99/61     Constitutional:       Appearance: Well-developed.   Eyes:      Conjunctiva/sclera: Conjunctivae normal.      Pupils: Pupils are equal, round, and reactive to light.   HENT:      Head: Normocephalic and atraumatic.      Nose: Nose normal.   Neck:      Thyroid: No thyromegaly.      Vascular: No JVD.      Lymphadenopathy: No cervical adenopathy.   Pulmonary:      Effort: Pulmonary effort is normal.      Breath sounds: Normal breath sounds. No rales.   Cardiovascular:      Normal rate.  Regular rhythm.      Murmurs: There is a grade 3/6 harsh midsystolic murmur at the URSB, radiating to the neck.      No gallop.    Pulses:     Intact distal pulses. No decreased pulses.   Edema:     Peripheral edema absent.   Abdominal:      General: Bowel sounds are normal. There is no distension.      Palpations: Abdomen is soft. There is no abdominal mass.      Tenderness: There is no abdominal tenderness.   Musculoskeletal: Normal range of motion.         General: No tenderness or deformity.      Cervical back: Normal range of motion and neck supple. Skin:     General: Skin is warm and dry.      Findings: No erythema or rash.   Neurological:      Mental Status: Alert and oriented to person, place, and time.      Deep Tendon Reflexes: Reflexes are normal and symmetric.   Psychiatric:         Behavior: Behavior normal.          Assessment:     Problems Addressed this Visit          Cardiac and Vasculature    Atrial fibrillation (Chronic)    Nonischemic cardiomyopathy    Nonrheumatic aortic valve stenosis    Aortic root dilation - Primary     Diagnoses         Codes Comments      Aortic root dilation    -  Primary ICD-10-CM: I77.810  ICD-9-CM: 447.71       Permanent atrial fibrillation     ICD-10-CM: I48.21  ICD-9-CM: 427.31       Nonischemic cardiomyopathy     ICD-10-CM: I42.8  ICD-9-CM: 425.4       Nonrheumatic aortic valve stenosis     ICD-10-CM: I35.0  ICD-9-CM: 424.1           Assessment/recommendation:     84 yo male with severe aortic stenosis and moderate aortic insufficiency, early symptoms, mild LV dysfunction of unclear cardiomyopathy.  I discussed with the patient my recommendation for valve replacement.  Based on his age and comorbidities I would favor TAVR over SAVR however the patient has an ascending aortic aneurysm which has not been evaluated in detail.  I would like to proceed with a TAVR CTA to further address the diameter of the aortic root and ascending aorta and then complete the  recommendation.  If the ascending aorta is less than 4.6 cm, then I think it is appropriate to offer a percutaneous procedure.  If the ascending aorta is larger than that then we should have to discuss based on the size or if there is complication then decide whether surgery may be more appropriate.  He also has atrial fibrillation he is treated with Xarelto.  He has had a cardiac cath in the past and he has no significant coronary artery disease.  I will review the TAVR CTA tomorrow and then complete my recommendation I will talk to him and with the cardiology team for consensus    Thank you for allowing me to participate in his care.    Regards,    Maurice Cosby M.D.    I spent over 45 minutes (approximately half of the time face-to-face) before, during after the office visit and reviewing the record, examining the patient, reviewing interpreting myself echocardiograms, the cardiac cath, spent time discussing the findings and options and discussing the natural history of aortic stenosis and aneurysm disease is spent time discussing the need to evaluate a TAVR CTA and the size of the aorta, the need to see one of our structural cardiologist I spent time discussing the case with the cardiology team and documenting in the electronic record

## 2025-07-30 ENCOUNTER — HOSPITAL ENCOUNTER (OUTPATIENT)
Dept: CT IMAGING | Facility: HOSPITAL | Age: 85
Discharge: HOME | End: 2025-07-30
Admitting: INTERNAL MEDICINE
Payer: MEDICARE

## 2025-07-30 ENCOUNTER — PATIENT ROUNDING (BHMG ONLY) (OUTPATIENT)
Dept: CARDIAC SURGERY | Facility: CLINIC | Age: 85
End: 2025-07-30
Payer: MEDICARE

## 2025-07-30 DIAGNOSIS — I35.0 NONRHEUMATIC AORTIC VALVE STENOSIS: ICD-10-CM

## 2025-07-30 LAB — CREAT BLDA-MCNC: 1.5 MG/DL (ref 0.6–1.3)

## 2025-07-30 PROCEDURE — 74174 CTA ABD&PLVS W/CONTRAST: CPT

## 2025-07-30 PROCEDURE — 71275 CT ANGIOGRAPHY CHEST: CPT

## 2025-07-30 PROCEDURE — 82565 ASSAY OF CREATININE: CPT

## 2025-07-30 PROCEDURE — 25510000001 IOPAMIDOL PER 1 ML: Performed by: INTERNAL MEDICINE

## 2025-07-30 RX ORDER — IOPAMIDOL 755 MG/ML
100 INJECTION, SOLUTION INTRAVASCULAR
Status: COMPLETED | OUTPATIENT
Start: 2025-07-30 | End: 2025-07-30

## 2025-07-30 RX ADMIN — IOPAMIDOL 95 ML: 755 INJECTION, SOLUTION INTRAVENOUS at 11:47

## 2025-07-30 NOTE — PROGRESS NOTES
A My Chart message has been sent to the patient for PATIENT ROUNDING with [facility] [place]

## 2025-07-30 NOTE — PROGRESS NOTES
7/29/2025      Chief Complaint     Fatigue, evaluation of aortic stenosis    History of Present Illness:       Dear Tez and Colleagues,  It was nice to see Dat Menjivar in consultation at your request. He is a 85 y.o. male with a history of atrial fibrillation, nonischemic cardiomyopathy nonobstructive coronary artery disease, pulmonary nodules who has developed fatigue and shortness of breath while performing the usual exercise routine.  He is very active for his age and he spin bike 3 days a week and lifting weights 3 days a week.  He lives in [place] and he surfs intermittently.  He is very well fit for his age.  He recently has developed fatigue and some shortness of breath while lifting weights and exercising.. He denies syncope, TIA, orthopnea, PND or lower extremity edema.  He had an echocardiogram performed in June 2025 that showed an ejection fraction of 50%, moderate aortic valve regurgitation and severe aortic stenosis with a valve area of 0.65 cm squire, peak velocity of 4.4 m/s, peak gradient of 78 mmHg, mean gradient of 40 mmHg and aortic valve dimensionless index of 0.20.  He has mild mitral valve regurgitation.  He also showed moderate dilatation of the aortic root at 4.7 cm.  A previous echocardiogram in January 2025 show dilatation of the aortic root at 4.6 cm and the ascending aorta 4.8 cm.  He has no family history of aneurysms, dissections or connective tissue disorders.  He came to [place] to have his medical evaluation and he wishes to be treated in the area.  He has consulted online to other places.  He hopes a TAVR is feasible.      Patient Active Problem List   Diagnosis    Atrial fibrillation    Ascending aortic aneurysm    Nonischemic cardiomyopathy    Nonobstructive atherosclerosis of coronary artery    Nonrheumatic aortic valve stenosis    Nonrheumatic aortic valve insufficiency    Aortic root dilation       Past Medical History:   Diagnosis Date    Abnormal ECG     Aortic  root dilation 06/26/2025    Atrial fibrillation     CHF (congestive heart failure)     Clotting disorder     Depression     Fracture of wrist why I'm here    Heart valve disease     HL (hearing loss)     Mitral valve prolapse     Nonobstructive atherosclerosis of coronary artery 06/26/2025    Nonrheumatic aortic valve insufficiency 06/26/2025    Nonrheumatic aortic valve stenosis 06/26/2025    Sleep apnea     Wrist sprain part of fracture       Past Surgical History:   Procedure Laterality Date    CARDIAC CATHETERIZATION N/A 10/23/2024    Procedure: Left Heart Cath;  Surgeon: Emmy Uribe MD;  Location:  [facility] CATH INVASIVE LOCATION;  Service: Cardiovascular;  Laterality: N/A;    CARDIAC CATHETERIZATION N/A 10/23/2024    Procedure: Coronary angiography;  Surgeon: Emmy Uribe MD;  Location:  [facility] CATH INVASIVE LOCATION;  Service: Cardiovascular;  Laterality: N/A;    CARDIAC CATHETERIZATION  10/23/2024    ELBOW PROCEDURE      EYE SURGERY Bilateral cataract    FRACTURE SURGERY  wrist //elbow    HERNIA REPAIR  50 years ago    HIP SURGERY Right     JOINT REPLACEMENT  right hip/left knee    KNEE SURGERY Left     PROSTATE SURGERY  TURP    SINUS SURGERY         Allergies   Allergen Reactions    Latex Hives and Itching     Per pt         Current Outpatient Medications:     ALPRAZolam (XANAX) 1 MG tablet, Take 1 tablet by mouth At Night As Needed for Anxiety., Disp: 90 tablet, Rfl: 1    coenzyme Q10 100 MG capsule, Take 1 capsule by mouth Daily., Disp: , Rfl:     COLLAGEN PO, Take  by mouth., Disp: , Rfl:     Flaxseed, Linseed, (FLAXSEED OIL PO), Take  by mouth., Disp: , Rfl:     glucosamine-chondroitin 500-400 MG capsule capsule, Take  by mouth 3 (Three) Times a Day With Meals., Disp: , Rfl:     L-Arginine 1000 MG tablet, Take 1,000 mg by mouth Daily., Disp: , Rfl:     LINOLEIC ACID-SUNFLOWER OIL PO, Take 1,200 mg by mouth Daily., Disp: , Rfl:     metoprolol succinate XL (TOPROL-XL) 25 MG 24 hr tablet, Take 0.5 tablets  by mouth Daily., Disp: 90 tablet, Rfl: 3    Misc Natural Products (RESVERATIN PLUS PO), Take  by mouth., Disp: , Rfl:     NON FORMULARY, Take 1 tablet by mouth 2 (Two) Times a Day. Vymada-----50 MG; patient is taking 100 mg daily (Patient taking differently: Take 1 tablet by mouth 2 (Two) Times a Day. Vymada-----50 MG;), Disp: , Rfl:     Prasterone, DHEA, (DHEA 50 PO), Take 50 mg by mouth Daily., Disp: , Rfl:     Probiotic Product (Probiotic 10 Ultra Strength) capsule, Take  by mouth., Disp: , Rfl:     Quercetin 500 MG capsule, Take 4 capsules by mouth Daily., Disp: , Rfl:     rivaroxaban (XARELTO) 20 MG tablet, Take 1 tablet by mouth Daily With Dinner., Disp: , Rfl:     sacubitril-valsartan (Entresto) 24-26 MG tablet, Take 1 tablet by mouth 2 (Two) Times a Day., Disp: 90 tablet, Rfl: 3    Selenium 200 MCG tablet, Take 200 mcg by mouth Daily., Disp: , Rfl:     spironolactone (ALDACTONE) 25 MG tablet, TAKE 1 TABLET BY MOUTH EVERY DAY, Disp: 90 tablet, Rfl: 3    tadalafil (Cialis) 5 MG tablet, Take 1 tablet by mouth Daily As Needed for Erectile Dysfunction., Disp: 30 tablet, Rfl: 5    tamsulosin (FLOMAX) 0.4 MG capsule 24 hr capsule, Take 1 capsule by mouth Daily., Disp: 90 capsule, Rfl: 3    Turmeric (QC Tumeric Complex) 500 MG capsule, Take 12 capsules by mouth Daily., Disp: , Rfl:     vitamin B-12 (CYANOCOBALAMIN) 1000 MCG tablet, Take 1 tablet by mouth Daily., Disp: , Rfl:   No current facility-administered medications for this visit.    Social History     Socioeconomic History    Marital status: Single    Number of children: 1   Tobacco Use    Smoking status: Former     Current packs/day: 0.00     Average packs/day: 0.3 packs/day for 6.0 years (1.6 ttl pk-yrs)     Types: Cigarettes, Cigars     Start date: 1960     Quit date: 1964     Years since quittin.6     Passive exposure: Past    Smokeless tobacco: Never    Tobacco comments:     not since    Vaping Use    Vaping status: Never Used    Substance and Sexual Activity    Alcohol use: Yes     Alcohol/week: 4.0 standard drinks of alcohol     Types: 2 Glasses of wine, 2 Drinks containing 0.5 oz of alcohol per week     Comment: couple of gls wine per week    Drug use: Not Currently     Types: Marijuana     Comment: occasionally    Sexual activity: Yes     Partners: Female     Birth control/protection: None       Family History   Problem Relation Age of Onset    Diabetes Sister     Arthritis Sister     Arthritis Sister     Diabetes Sister     Hearing loss Sister     Arrhythmia Sister         pacemaker implementation     Review of Systems:  As HPI, otherwise noncontributory    Physical Exam:    Vital Signs:  Weight: 86.2 kg (190 lb)   Body mass index is 25.07 kg/m².  Temp: 97.1 °F (36.2 °C)   Heart Rate: 86   BP: 99/61     Constitutional:       Appearance: Well-developed.   Eyes:      Conjunctiva/sclera: Conjunctivae normal.      Pupils: Pupils are equal, round, and reactive to light.   HENT:      Head: Normocephalic and atraumatic.      Nose: Nose normal.   Neck:      Thyroid: No thyromegaly.      Vascular: No JVD.      Lymphadenopathy: No cervical adenopathy.   Pulmonary:      Effort: Pulmonary effort is normal.      Breath sounds: Normal breath sounds. No rales.   Cardiovascular:      Normal rate. Regular rhythm.      Murmurs: There is a grade 3/6 harsh midsystolic murmur at the URSB, radiating to the neck.      No gallop.    Pulses:     Intact distal pulses. No decreased pulses.   Edema:     Peripheral edema absent.   Abdominal:      General: Bowel sounds are normal. There is no distension.      Palpations: Abdomen is soft. There is no abdominal mass.      Tenderness: There is no abdominal tenderness.   Musculoskeletal: Normal range of motion.         General: No tenderness or deformity.      Cervical back: Normal range of motion and neck supple. Skin:     General: Skin is warm and dry.      Findings: No erythema or rash.   Neurological:      Mental  Status: Alert and oriented to person, place, and time.      Deep Tendon Reflexes: Reflexes are normal and symmetric.   Psychiatric:         Behavior: Behavior normal.          Assessment:     Problems Addressed this Visit          Cardiac and Vasculature    Atrial fibrillation (Chronic)    Nonischemic cardiomyopathy    Nonrheumatic aortic valve stenosis    Aortic root dilation - Primary     Diagnoses         Codes Comments      Aortic root dilation    -  Primary ICD-10-CM: I77.810  ICD-9-CM: 447.71       Permanent atrial fibrillation     ICD-10-CM: I48.21  ICD-9-CM: 427.31       Nonischemic cardiomyopathy     ICD-10-CM: I42.8  ICD-9-CM: 425.4       Nonrheumatic aortic valve stenosis     ICD-10-CM: I35.0  ICD-9-CM: 424.1           Assessment/recommendation:     86 yo male with severe aortic stenosis and moderate aortic insufficiency, early symptoms, mild LV dysfunction of unclear cardiomyopathy.  I discussed with the patient my recommendation for valve replacement.  Based on his age and comorbidities I would favor TAVR over SAVR however the patient has an ascending aortic aneurysm which has not been evaluated in detail.  I would like to proceed with a TAVR CTA to further address the diameter of the aortic root and ascending aorta and then complete the recommendation.  If the ascending aorta is less than 4.6 cm, then I think it is appropriate to offer a percutaneous procedure.  If the ascending aorta is larger than that then we should have to discuss based on the size or if there is complication then decide whether surgery may be more appropriate.  He also has atrial fibrillation he is treated with Xarelto.  He has had a cardiac cath in the past and he has no significant coronary artery disease.  I will review the TAVR CTA tomorrow and then complete my recommendation I will talk to him and with the cardiology team for consensus    Thank you for allowing me to participate in his care.    Regards,    Maurice Cosby,  M.D.    I spent over 45 minutes (approximately half of the time face-to-face) before, during after the office visit and reviewing the record, examining the patient, reviewing interpreting myself echocardiograms, the cardiac cath, spent time discussing the findings and options and discussing the natural history of aortic stenosis and aneurysm disease is spent time discussing the need to evaluate a TAVR CTA and the size of the aorta, the need to see one of our structural cardiologist I spent time discussing the case with the cardiology team and documenting in the electronic record

## 2025-07-31 ENCOUNTER — OFFICE VISIT (OUTPATIENT)
Age: 85
End: 2025-07-31
Payer: MEDICARE

## 2025-07-31 ENCOUNTER — PREP FOR SURGERY (OUTPATIENT)
Dept: OTHER | Facility: HOSPITAL | Age: 85
End: 2025-07-31
Payer: MEDICARE

## 2025-07-31 VITALS
WEIGHT: 194 LBS | HEART RATE: 62 BPM | DIASTOLIC BLOOD PRESSURE: 84 MMHG | BODY MASS INDEX: 25.71 KG/M2 | SYSTOLIC BLOOD PRESSURE: 120 MMHG | HEIGHT: 73 IN

## 2025-07-31 DIAGNOSIS — I35.0 AORTIC VALVE STENOSIS, ETIOLOGY OF CARDIAC VALVE DISEASE UNSPECIFIED: Primary | ICD-10-CM

## 2025-07-31 DIAGNOSIS — I11.0 HYPERTENSIVE HEART DISEASE WITH HEART FAILURE: ICD-10-CM

## 2025-07-31 DIAGNOSIS — I35.0 NONRHEUMATIC AORTIC VALVE STENOSIS: Primary | ICD-10-CM

## 2025-07-31 DIAGNOSIS — I35.1 NONRHEUMATIC AORTIC VALVE INSUFFICIENCY: ICD-10-CM

## 2025-07-31 DIAGNOSIS — I50.23 ACUTE ON CHRONIC SYSTOLIC CHF (CONGESTIVE HEART FAILURE): ICD-10-CM

## 2025-07-31 DIAGNOSIS — R79.1 ABNORMAL COAGULATION PROFILE: ICD-10-CM

## 2025-07-31 DIAGNOSIS — I48.21 PERMANENT ATRIAL FIBRILLATION: ICD-10-CM

## 2025-07-31 DIAGNOSIS — R79.9 ABNORMAL FINDING OF BLOOD CHEMISTRY, UNSPECIFIED: ICD-10-CM

## 2025-07-31 DIAGNOSIS — R06.02 SOB (SHORTNESS OF BREATH): ICD-10-CM

## 2025-07-31 DIAGNOSIS — I71.21 ANEURYSM OF ASCENDING AORTA WITHOUT RUPTURE: ICD-10-CM

## 2025-07-31 RX ORDER — CHLORHEXIDINE GLUCONATE ORAL RINSE 1.2 MG/ML
15 SOLUTION DENTAL EVERY 12 HOURS
OUTPATIENT
Start: 2025-07-31 | End: 2025-08-01

## 2025-07-31 RX ORDER — CHLORHEXIDINE GLUCONATE ORAL RINSE 1.2 MG/ML
15 SOLUTION DENTAL ONCE
OUTPATIENT
Start: 2025-07-31 | End: 2025-07-31

## 2025-07-31 NOTE — PROGRESS NOTES
Dat Menjivar  1940  Date of Office Visit: 07/31/25  Encounter Provider: Carloz Doty MD  Place of Service: [facility] CARDIOLOGY      CHIEF COMPLAINT:  Severe degenerative aortic valve stenosis    HISTORY OF PRESENT ILLNESS:  85-year-old male who follows with Dr. Tez Mark, who presents to me for evaluation of severe degenerative aortic valve stenosis.  He has a medical history of permanent atrial fibrillation, ascending aortic aneurysm, nonischemic cardiomyopathy, and aortic valve stenosis.  His last transthoracic echocardiogram was performed on 6/25/2025.  I have reviewed this.  His ejection fraction is 50%.  He has moderate aortic valve regurgitation and severe degenerative aortic valve stenosis.  Mean pressure gradient 40 mmHg across the valve with a peak velocity of 4.43 m/s and a dimensionless index of 0.2.  Also noted have severe concentric hypertrophy with a normal PYP scan.    Patient has been evaluated by Dr. Maurice Cosby and subsequently was referred for transcatheter aortic valve replacement CTA.  Upon my review of the CTA that ascending aorta looks to be about 4.5 cm    He denies any chest pain or syncope.  He does occasionally have heavy breathing and dyspnea with his high level of activity.    Review of Systems   Constitutional: Negative for fever and malaise/fatigue.   HENT:  Negative for nosebleeds and sore throat.    Eyes:  Negative for blurred vision and double vision.   Cardiovascular:  Positive for dyspnea on exertion. Negative for chest pain, claudication, palpitations and syncope.   Respiratory:  Negative for cough, shortness of breath and snoring.    Endocrine: Negative for cold intolerance, heat intolerance and polydipsia.   Skin:  Negative for itching, poor wound healing and rash.   Musculoskeletal:  Negative for joint pain, joint swelling, muscle weakness and myalgias.   Gastrointestinal:  Negative for abdominal pain, melena, nausea  and vomiting.   Neurological:  Negative for light-headedness, loss of balance, seizures, vertigo and weakness.   Psychiatric/Behavioral:  Negative for altered mental status and depression.        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 90 tablet 1    coenzyme Q10 100 MG capsule Take 1 capsule by mouth Daily.      COLLAGEN PO Take  by mouth.      Flaxseed, Linseed, (FLAXSEED OIL PO) Take  by mouth.      glucosamine-chondroitin 500-400 MG capsule capsule Take  by mouth 3 (Three) Times a Day With Meals.      L-Arginine 1000 MG tablet Take 1,000 mg by mouth Daily.      LINOLEIC ACID-SUNFLOWER OIL PO Take 1,200 mg by mouth Daily.      metoprolol succinate XL (TOPROL-XL) 25 MG 24 hr tablet Take 0.5 tablets by mouth Daily. 90 tablet 3    Misc Natural Products (RESVERATIN PLUS PO) Take  by mouth.      NON FORMULARY Take 1 tablet by mouth 2 (Two) Times a Day. Vymada-----50 MG; patient is taking 100 mg daily (Patient taking differently: Take 1 tablet by mouth 2 (Two) Times a Day. Vymada-----50 MG;)      Prasterone, DHEA, (DHEA 50 PO) Take 50 mg by mouth Daily.      Probiotic Product (Probiotic 10 Ultra Strength) capsule Take   "by mouth.      Quercetin 500 MG capsule Take 4 capsules by mouth Daily.      rivaroxaban (XARELTO) 20 MG tablet Take 1 tablet by mouth Daily With Dinner.      sacubitril-valsartan (Entresto) 24-26 MG tablet Take 1 tablet by mouth 2 (Two) Times a Day. 90 tablet 3    Selenium 200 MCG tablet Take 200 mcg by mouth Daily.      spironolactone (ALDACTONE) 25 MG tablet TAKE 1 TABLET BY MOUTH EVERY DAY 90 tablet 3    tadalafil (Cialis) 5 MG tablet Take 1 tablet by mouth Daily As Needed for Erectile Dysfunction. 30 tablet 5    tamsulosin (FLOMAX) 0.4 MG capsule 24 hr capsule Take 1 capsule by mouth Daily. 90 capsule 3    Turmeric (QC Tumeric Complex) 500 MG capsule Take 12 capsules by mouth Daily.      vitamin B-12 (CYANOCOBALAMIN) 1000 MCG tablet Take 1 tablet by mouth Daily.       Current Facility-Administered Medications on File Prior to Visit   Medication Dose Route Frequency Provider Last Rate Last Admin    [COMPLETED] iopamidol (ISOVUE-370) 76 % injection 100 mL  100 mL Intravenous Once in imaging Carolz Doty MD   95 mL at 07/30/25 1147       Allergies   Allergen Reactions    Latex Hives and Itching     Per pt       Vitals:    07/31/25 1046   Height: 185.4 cm (73\")     Body mass index is 25.3 kg/m².   Constitutional:       Appearance: Well-developed.   Eyes:      General: No scleral icterus.     Conjunctiva/sclera: Conjunctivae normal.   HENT:      Head: Normocephalic and atraumatic.   Neck:      Thyroid: No thyromegaly.      Vascular: Normal carotid pulses. No carotid bruit, hepatojugular reflux or JVD.      Trachea: No tracheal deviation.   Pulmonary:      Effort: No respiratory distress.      Breath sounds: Normal breath sounds. No decreased breath sounds. No wheezing. No rhonchi. No rales.   Chest:      Chest wall: Not tender to palpatation.   Cardiovascular:      Normal rate. Regular rhythm.      Murmurs: There is a grade 3/6 mid to late systolic murmur.      No gallop.    Pulses:     Carotid: 2+ " bilaterally.     Radial: 2+ bilaterally.     Femoral: 2+ bilaterally.     Dorsalis pedis: 2+ bilaterally.     Posterior tibial: 2+ bilaterally.  Edema:     Peripheral edema absent.   Abdominal:      General: Bowel sounds are normal. There is no distension.      Palpations: Abdomen is soft.      Tenderness: There is no abdominal tenderness.   Musculoskeletal:         General: No deformity.      Cervical back: Normal range of motion and neck supple. Skin:     Findings: No erythema or rash.   Neurological:      Mental Status: Alert and oriented to person, place, and time.      Sensory: No sensory deficit.   Psychiatric:         Behavior: Behavior normal.         Lab Results   Component Value Date    WBC 6.6 06/24/2025    HGB 17.2 06/24/2025    HCT 54.5 (H) 06/24/2025     (H) 06/24/2025     (L) 06/24/2025       Lab Results   Component Value Date    GLUCOSE 82 06/24/2025    BUN 28 (H) 06/24/2025    CREATININE 1.50 (H) 07/30/2025     06/24/2025    K 3.9 06/24/2025     (H) 06/24/2025    CALCIUM 9.4 06/24/2025    PROTEINTOT 6.5 06/24/2025    ALBUMIN 4.1 06/24/2025    ALT 16 06/24/2025    AST 21 06/24/2025    ALKPHOS 72 06/24/2025    BILITOT 0.5 06/24/2025    GLOB 2.4 06/24/2025    AGRATIO 1.4 07/31/2024    BCR 18 06/24/2025    ANIONGAP 8.0 10/23/2024    EGFR 44 (L) 06/24/2025       Lab Results   Component Value Date    GLUCOSE 82 06/24/2025    CALCIUM 9.4 06/24/2025     06/24/2025    K 3.9 06/24/2025    CO2 17 (L) 06/24/2025     (H) 06/24/2025    BUN 28 (H) 06/24/2025    CREATININE 1.50 (H) 07/30/2025    EGFR 44 (L) 06/24/2025    BCR 18 06/24/2025    ANIONGAP 8.0 10/23/2024       Lab Results   Component Value Date    CHOL 161 07/31/2024    CHLPL 167 06/24/2025    TRIG 112 06/24/2025    HDL 47 06/24/2025     (H) 06/24/2025       Procedures       Results for orders placed during the hospital encounter of 06/25/25    Adult Transthoracic Echo Complete w/ Color, Spectral and Contrast  if Necessary Per Protocol 06/25/2025  5:10 PM    Interpretation Summary    Left ventricular systolic function is normal. Calculated left ventricular EF = 50.6%    Left ventricular wall thickness is consistent with severe concentric hypertrophy.    Abnormal global longitudinal LV strain (GLS) = -11.2%.  Pattern is consistent with possible amyloidosis.    Left ventricular diastolic function was indeterminate.    The left atrial cavity is severely dilated.    Moderate aortic valve regurgitation is present.    Severe aortic valve stenosis is present. Aortic valve area is 0.7 cm2.    Peak velocity of the flow distal to the aortic valve is 443.3 cm/s. Aortic valve maximum pressure gradient is 79 mmHg. Aortic valve mean pressure gradient is 40 mmHg. Aortic valve dimensionless index is 0.2 .    There is mild, bileaflet mitral valve thickening present.    Moderate dilation of the aortic root is present. The aortic root measures 4.7 cm.    10/24/24  CORONARY ANGIOGRAPHY:  LEFT MAIN:Large-caliber, normal left main coronary artery.  Bifurcates give the LAD and circumflex arteries.  LAD: The LAD is a medium to large caliber vessel.  Normal in the proximal segment.  Tortuous in the midsegment.  Calcification throughout the proximal mid vessel.  There is a 30% mid vessel stenosis.  Distal to that the LAD is normal.  There are 2 small caliber diagonals which are normal.  There is YULISA II flow throughout the LAD.  Ramus: Absent  Circumflex: Large caliber circumflex.  Normal in the proximal segment.  Gives rise to a large caliber mid branching OM which is bifurcating and normal.  RCA: The RCA is a large-caliber dominant vessel.  Normal.  PDA and CLAU are medium and normal.  YULISA II flow throughout.    DISCUSSION/SUMMARY  Very pleasant 85-year-old male with a medical history of permanent atrial fibrillation, ascending aortic aneurysm measuring around 4.5 cm, previously documented nonischemic cardiomyopathy with a normal ejection  fraction currently at 50%, severe LVH, normal PYP scan, who presents to me for evaluation of transcatheter aortic valve replacement.  I do think that he would be a good candidate for this moving forward.  I have reviewed his CTA and his ascending aorta is 4.5 cm.  I would not push him towards cardiac surgery in the setting of this measurement and the patient's advanced age.    1.  Severe degenerative aortic valve stenosis  - Patient's only symptom at this point in time is dyspnea with heavy level of exertion.  Near Heart Association class II symptoms.  - Plan on transcatheter aortic valve replacement.    2.  Permanent atrial fibrillation: Continue Xarelto therapy at current dose.  Tolerating well.  No recent bleeding complications.  Ventricular rate well-controlled.    3.  Severe LVH: PYP negative.    4.  History of nonischemic cardiomyopathy: Ejection fraction of 50%.  - Continue spironolactone, low-dose Entresto, and Toprol therapy at current dose.  Tolerating well.  Appears euvolemic.

## 2025-08-07 ENCOUNTER — PRE-ADMISSION TESTING (OUTPATIENT)
Dept: PREADMISSION TESTING | Facility: HOSPITAL | Age: 85
End: 2025-08-07
Payer: MEDICARE

## 2025-08-07 ENCOUNTER — HOSPITAL ENCOUNTER (OUTPATIENT)
Dept: GENERAL RADIOLOGY | Facility: HOSPITAL | Age: 85
Discharge: HOME | End: 2025-08-07
Payer: MEDICARE

## 2025-08-07 VITALS
WEIGHT: 198.8 LBS | OXYGEN SATURATION: 98 % | SYSTOLIC BLOOD PRESSURE: 105 MMHG | RESPIRATION RATE: 18 BRPM | DIASTOLIC BLOOD PRESSURE: 68 MMHG | BODY MASS INDEX: 28.46 KG/M2 | HEART RATE: 89 BPM | TEMPERATURE: 98 F | HEIGHT: 70 IN

## 2025-08-07 DIAGNOSIS — I35.0 AORTIC VALVE STENOSIS, ETIOLOGY OF CARDIAC VALVE DISEASE UNSPECIFIED: ICD-10-CM

## 2025-08-07 LAB
ABO GROUP BLD: NORMAL
ALBUMIN SERPL-MCNC: 3.8 G/DL (ref 3.5–5.2)
ALBUMIN/GLOB SERPL: 1.4 G/DL
ALP SERPL-CCNC: 78 U/L (ref 39–117)
ALT SERPL W P-5'-P-CCNC: 19 U/L (ref 1–41)
ANION GAP SERPL CALCULATED.3IONS-SCNC: 10.1 MMOL/L (ref 5–15)
APTT PPP: 39.6 SECONDS (ref 22.7–35.4)
AST SERPL-CCNC: 24 U/L (ref 1–40)
BACTERIA UR QL AUTO: ABNORMAL /HPF
BASOPHILS # BLD AUTO: 0.05 10*3/MM3 (ref 0–0.2)
BASOPHILS NFR BLD AUTO: 0.8 % (ref 0–1.5)
BILIRUB SERPL-MCNC: 0.3 MG/DL (ref 0–1.2)
BILIRUB UR QL STRIP: NEGATIVE
BLD GP AB SCN SERPL QL: NEGATIVE
BUN SERPL-MCNC: 32 MG/DL (ref 8–23)
BUN/CREAT SERPL: 20 (ref 7–25)
CALCIUM SPEC-SCNC: 9.2 MG/DL (ref 8.6–10.5)
CHLORIDE SERPL-SCNC: 112 MMOL/L (ref 98–107)
CLARITY UR: CLEAR
CLOSE TME COLL+ADP + EPINEP PNL BLD: 92 % (ref 86–100)
CO2 SERPL-SCNC: 22.9 MMOL/L (ref 22–29)
COLOR UR: YELLOW
CREAT SERPL-MCNC: 1.6 MG/DL (ref 0.76–1.27)
DEPRECATED RDW RBC AUTO: 41.6 FL (ref 37–54)
EGFRCR SERPLBLD CKD-EPI 2021: 42.5 ML/MIN/1.73
EOSINOPHIL # BLD AUTO: 0.11 10*3/MM3 (ref 0–0.4)
EOSINOPHIL NFR BLD AUTO: 1.8 % (ref 0.3–6.2)
ERYTHROCYTE [DISTWIDTH] IN BLOOD BY AUTOMATED COUNT: 12.1 % (ref 12.3–15.4)
GLOBULIN UR ELPH-MCNC: 2.7 GM/DL
GLUCOSE SERPL-MCNC: 93 MG/DL (ref 65–99)
GLUCOSE UR STRIP-MCNC: NEGATIVE MG/DL
HBA1C MFR BLD: 5.6 % (ref 4.8–5.6)
HCT VFR BLD AUTO: 47.8 % (ref 37.5–51)
HGB BLD-MCNC: 15.6 G/DL (ref 13–17.7)
HGB UR QL STRIP.AUTO: ABNORMAL
HYALINE CASTS UR QL AUTO: ABNORMAL /LPF
IMM GRANULOCYTES # BLD AUTO: 0.02 10*3/MM3 (ref 0–0.05)
IMM GRANULOCYTES NFR BLD AUTO: 0.3 % (ref 0–0.5)
INR PPP: 2.6 (ref 0.9–1.1)
KETONES UR QL STRIP: NEGATIVE
LEUKOCYTE ESTERASE UR QL STRIP.AUTO: ABNORMAL
LYMPHOCYTES # BLD AUTO: 1.61 10*3/MM3 (ref 0.7–3.1)
LYMPHOCYTES NFR BLD AUTO: 25.9 % (ref 19.6–45.3)
MCH RBC QN AUTO: 31 PG (ref 26.6–33)
MCHC RBC AUTO-ENTMCNC: 32.6 G/DL (ref 31.5–35.7)
MCV RBC AUTO: 94.8 FL (ref 79–97)
MONOCYTES # BLD AUTO: 0.49 10*3/MM3 (ref 0.1–0.9)
MONOCYTES NFR BLD AUTO: 7.9 % (ref 5–12)
NEUTROPHILS NFR BLD AUTO: 3.94 10*3/MM3 (ref 1.7–7)
NEUTROPHILS NFR BLD AUTO: 63.3 % (ref 42.7–76)
NITRITE UR QL STRIP: NEGATIVE
NT-PROBNP SERPL-MCNC: 751 PG/ML (ref 0–1800)
PH UR STRIP.AUTO: 5.5 [PH] (ref 5–8)
PLATELET # BLD AUTO: 156 10*3/MM3 (ref 140–450)
PMV BLD AUTO: 9.6 FL (ref 6–12)
POTASSIUM SERPL-SCNC: 4.3 MMOL/L (ref 3.5–5.2)
PROT SERPL-MCNC: 6.5 G/DL (ref 6–8.5)
PROT UR QL STRIP: NEGATIVE
PROTHROMBIN TIME: 28.1 SECONDS (ref 11.7–14.2)
QT INTERVAL: 429 MS
QTC INTERVAL: 485 MS
RBC # BLD AUTO: 5.04 10*6/MM3 (ref 4.14–5.8)
RBC # UR STRIP: ABNORMAL /HPF
REF LAB TEST METHOD: ABNORMAL
RH BLD: POSITIVE
SODIUM SERPL-SCNC: 145 MMOL/L (ref 136–145)
SP GR UR STRIP: 1.02 (ref 1–1.03)
SQUAMOUS #/AREA URNS HPF: ABNORMAL /HPF
T&S EXPIRATION DATE: NORMAL
UROBILINOGEN UR QL STRIP: ABNORMAL
WBC # UR STRIP: ABNORMAL /HPF
WBC NRBC COR # BLD AUTO: 6.22 10*3/MM3 (ref 3.4–10.8)

## 2025-08-07 PROCEDURE — 81001 URINALYSIS AUTO W/SCOPE: CPT | Performed by: NURSE PRACTITIONER

## 2025-08-07 PROCEDURE — 93005 ELECTROCARDIOGRAM TRACING: CPT

## 2025-08-07 PROCEDURE — 86850 RBC ANTIBODY SCREEN: CPT | Performed by: NURSE PRACTITIONER

## 2025-08-07 PROCEDURE — 85576 BLOOD PLATELET AGGREGATION: CPT | Performed by: NURSE PRACTITIONER

## 2025-08-07 PROCEDURE — 85025 COMPLETE CBC W/AUTO DIFF WBC: CPT | Performed by: NURSE PRACTITIONER

## 2025-08-07 PROCEDURE — 85610 PROTHROMBIN TIME: CPT | Performed by: NURSE PRACTITIONER

## 2025-08-07 PROCEDURE — 71046 X-RAY EXAM CHEST 2 VIEWS: CPT

## 2025-08-07 PROCEDURE — 80053 COMPREHEN METABOLIC PANEL: CPT | Performed by: NURSE PRACTITIONER

## 2025-08-07 PROCEDURE — 83880 ASSAY OF NATRIURETIC PEPTIDE: CPT | Performed by: NURSE PRACTITIONER

## 2025-08-07 PROCEDURE — 83036 HEMOGLOBIN GLYCOSYLATED A1C: CPT | Performed by: NURSE PRACTITIONER

## 2025-08-07 PROCEDURE — 85730 THROMBOPLASTIN TIME PARTIAL: CPT | Performed by: NURSE PRACTITIONER

## 2025-08-07 PROCEDURE — 86900 BLOOD TYPING SEROLOGIC ABO: CPT | Performed by: NURSE PRACTITIONER

## 2025-08-07 PROCEDURE — 86901 BLOOD TYPING SEROLOGIC RH(D): CPT | Performed by: NURSE PRACTITIONER

## 2025-08-07 RX ORDER — CHLORHEXIDINE GLUCONATE ORAL RINSE 1.2 MG/ML
15 SOLUTION DENTAL EVERY 12 HOURS
Status: DISPENSED | OUTPATIENT
Start: 2025-08-07 | End: 2025-08-08

## 2025-08-08 ENCOUNTER — RESULTS FOLLOW-UP (OUTPATIENT)
Dept: CARDIOLOGY | Facility: HOSPITAL | Age: 85
End: 2025-08-08
Payer: MEDICARE

## 2025-08-11 ENCOUNTER — TELEPHONE (OUTPATIENT)
Dept: CARDIOLOGY | Facility: HOSPITAL | Age: 85
End: 2025-08-11
Payer: MEDICARE

## 2025-08-12 ENCOUNTER — HOSPITAL ENCOUNTER (INPATIENT)
Facility: HOSPITAL | Age: 85
LOS: 1 days | Discharge: HOME | DRG: 267 | End: 2025-08-13
Attending: THORACIC SURGERY (CARDIOTHORACIC VASCULAR SURGERY) | Admitting: THORACIC SURGERY (CARDIOTHORACIC VASCULAR SURGERY)
Payer: MEDICARE

## 2025-08-12 ENCOUNTER — ANCILLARY PROCEDURE (OUTPATIENT)
Dept: PERIOP | Facility: HOSPITAL | Age: 85
DRG: 267 | End: 2025-08-12
Payer: MEDICARE

## 2025-08-12 ENCOUNTER — ANESTHESIA (OUTPATIENT)
Dept: PERIOP | Facility: HOSPITAL | Age: 85
DRG: 267 | End: 2025-08-12
Payer: MEDICARE

## 2025-08-12 ENCOUNTER — ANESTHESIA EVENT (OUTPATIENT)
Dept: PERIOP | Facility: HOSPITAL | Age: 85
DRG: 267 | End: 2025-08-12
Payer: MEDICARE

## 2025-08-12 DIAGNOSIS — I35.0 NONRHEUMATIC AORTIC VALVE STENOSIS: ICD-10-CM

## 2025-08-12 DIAGNOSIS — I42.8 NONISCHEMIC CARDIOMYOPATHY: ICD-10-CM

## 2025-08-12 DIAGNOSIS — I50.23 ACUTE ON CHRONIC SYSTOLIC CHF (CONGESTIVE HEART FAILURE): ICD-10-CM

## 2025-08-12 DIAGNOSIS — R06.02 SOB (SHORTNESS OF BREATH): ICD-10-CM

## 2025-08-12 DIAGNOSIS — R79.1 ABNORMAL COAGULATION PROFILE: ICD-10-CM

## 2025-08-12 DIAGNOSIS — Z95.2 S/P TAVR (TRANSCATHETER AORTIC VALVE REPLACEMENT): ICD-10-CM

## 2025-08-12 DIAGNOSIS — R79.9 ABNORMAL FINDING OF BLOOD CHEMISTRY, UNSPECIFIED: ICD-10-CM

## 2025-08-12 DIAGNOSIS — I11.0 HYPERTENSIVE HEART DISEASE WITH HEART FAILURE: ICD-10-CM

## 2025-08-12 DIAGNOSIS — Z95.2 S/P TAVR (TRANSCATHETER AORTIC VALVE REPLACEMENT): Primary | ICD-10-CM

## 2025-08-12 DIAGNOSIS — I35.0 NONRHEUMATIC AORTIC VALVE STENOSIS: Primary | ICD-10-CM

## 2025-08-12 DIAGNOSIS — I35.0 AORTIC VALVE STENOSIS, ETIOLOGY OF CARDIAC VALVE DISEASE UNSPECIFIED: ICD-10-CM

## 2025-08-12 DIAGNOSIS — F41.9 ANXIETY: ICD-10-CM

## 2025-08-12 DIAGNOSIS — F51.01 PRIMARY INSOMNIA: ICD-10-CM

## 2025-08-12 LAB
ACT BLD: 112 SECONDS (ref 82–152)
ACT BLD: 135 SECONDS (ref 82–152)
ACT BLD: 337 SECONDS (ref 82–152)
AORTIC DIMENSIONLESS INDEX: 0.64 (DI)
AV MEAN PRESS GRAD SYS DOP V1V2: 6 MMHG
AV VMAX SYS DOP: 179.7 CM/SEC
BASE EXCESS BLDA CALC-SCNC: -9 MMOL/L (ref -5–5)
BH CV ECHO MEAS - AO MAX PG: 12.9 MMHG
BH CV ECHO MEAS - AO V2 VTI: 37 CM
BH CV ECHO MEAS - AVA(I,D): 2.1 CM2
BH CV ECHO MEAS - LV MAX PG: 4.7 MMHG
BH CV ECHO MEAS - LV MEAN PG: 2.9 MMHG
BH CV ECHO MEAS - LV V1 MAX: 108.5 CM/SEC
BH CV ECHO MEAS - LV V1 VTI: 23.7 CM
BH CV ECHO MEAS - LVOT AREA: 3.3 CM2
BH CV ECHO MEAS - LVOT DIAM: 2.05 CM
BH CV ECHO MEAS - SV(LVOT): 77.9 ML
BH CV ECHO MEAS - SVI(LVOT): 37.6 ML/M2
CO2 BLDA-SCNC: 17 MMOL/L (ref 24–29)
GLUCOSE BLDC GLUCOMTR-MCNC: 104 MG/DL (ref 70–130)
HCO3 BLDA-SCNC: 15.9 MMOL/L (ref 22–26)
HCT VFR BLDA CALC: 41 % (ref 38–51)
HGB BLDA-MCNC: 13.9 G/DL (ref 12–17)
PCO2 BLDA: 26.1 MM HG (ref 35–45)
PH BLDA: 7.4 PH UNITS (ref 7.35–7.6)
PO2 BLDA: 74 MMHG (ref 80–105)
POTASSIUM BLDA-SCNC: 4 MMOL/L (ref 3.5–4.9)
QT INTERVAL: 586 MS
QTC INTERVAL: 498 MS
SAO2 % BLDA: 95 % (ref 95–98)

## 2025-08-12 PROCEDURE — 25010000002 PROTAMINE SULFATE PER 10 MG: Performed by: ANESTHESIOLOGY

## 2025-08-12 PROCEDURE — 82947 ASSAY GLUCOSE BLOOD QUANT: CPT

## 2025-08-12 PROCEDURE — 93308 TTE F-UP OR LMTD: CPT

## 2025-08-12 PROCEDURE — B41D1ZZ FLUOROSCOPY OF AORTA AND BILATERAL LOWER EXTREMITY ARTERIES USING LOW OSMOLAR CONTRAST: ICD-10-PCS | Performed by: INTERNAL MEDICINE

## 2025-08-12 PROCEDURE — 82803 BLOOD GASES ANY COMBINATION: CPT

## 2025-08-12 PROCEDURE — 25010000002 FAMOTIDINE 10 MG/ML SOLUTION: Performed by: ANESTHESIOLOGY

## 2025-08-12 PROCEDURE — C1760 CLOSURE DEV, VASC: HCPCS

## 2025-08-12 PROCEDURE — 93321 DOPPLER ECHO F-UP/LMTD STD: CPT

## 2025-08-12 PROCEDURE — 02RF38Z REPLACEMENT OF AORTIC VALVE WITH ZOOPLASTIC TISSUE, PERCUTANEOUS APPROACH: ICD-10-PCS | Performed by: THORACIC SURGERY (CARDIOTHORACIC VASCULAR SURGERY)

## 2025-08-12 PROCEDURE — C1894 INTRO/SHEATH, NON-LASER: HCPCS | Performed by: THORACIC SURGERY (CARDIOTHORACIC VASCULAR SURGERY)

## 2025-08-12 PROCEDURE — 25010000002 FENTANYL CITRATE (PF) 50 MCG/ML SOLUTION: Performed by: ANESTHESIOLOGY

## 2025-08-12 PROCEDURE — 25010000002 PROPOFOL 10 MG/ML EMULSION: Performed by: ANESTHESIOLOGY

## 2025-08-12 PROCEDURE — 33361 REPLACE AORTIC VALVE PERQ: CPT | Performed by: INTERNAL MEDICINE

## 2025-08-12 PROCEDURE — 25010000002 PHENYLEPHRINE 10 MG/ML SOLUTION: Performed by: ANESTHESIOLOGY

## 2025-08-12 PROCEDURE — 85018 HEMOGLOBIN: CPT

## 2025-08-12 PROCEDURE — C1760 CLOSURE DEV, VASC: HCPCS | Performed by: THORACIC SURGERY (CARDIOTHORACIC VASCULAR SURGERY)

## 2025-08-12 PROCEDURE — C1769 GUIDE WIRE: HCPCS | Performed by: THORACIC SURGERY (CARDIOTHORACIC VASCULAR SURGERY)

## 2025-08-12 PROCEDURE — 85014 HEMATOCRIT: CPT

## 2025-08-12 PROCEDURE — 25510000001 IOPAMIDOL PER 1 ML: Performed by: THORACIC SURGERY (CARDIOTHORACIC VASCULAR SURGERY)

## 2025-08-12 PROCEDURE — 93325 DOPPLER ECHO COLOR FLOW MAPG: CPT

## 2025-08-12 PROCEDURE — 93010 ELECTROCARDIOGRAM REPORT: CPT | Performed by: STUDENT IN AN ORGANIZED HEALTH CARE EDUCATION/TRAINING PROGRAM

## 2025-08-12 PROCEDURE — 25010000002 LIDOCAINE 2% SOLUTION: Performed by: THORACIC SURGERY (CARDIOTHORACIC VASCULAR SURGERY)

## 2025-08-12 PROCEDURE — 25010000002 CEFAZOLIN PER 500 MG: Performed by: NURSE PRACTITIONER

## 2025-08-12 PROCEDURE — 25010000002 HEPARIN (PORCINE) PER 1000 UNITS: Performed by: ANESTHESIOLOGY

## 2025-08-12 PROCEDURE — 93005 ELECTROCARDIOGRAM TRACING: CPT | Performed by: INTERNAL MEDICINE

## 2025-08-12 PROCEDURE — 85347 COAGULATION TIME ACTIVATED: CPT

## 2025-08-12 PROCEDURE — C1889 IMPLANT/INSERT DEVICE, NOC: HCPCS | Performed by: THORACIC SURGERY (CARDIOTHORACIC VASCULAR SURGERY)

## 2025-08-12 PROCEDURE — 33361 REPLACE AORTIC VALVE PERQ: CPT | Performed by: THORACIC SURGERY (CARDIOTHORACIC VASCULAR SURGERY)

## 2025-08-12 DEVICE — VLV HEART TRNSCATH SAPIEN3 29MM: Type: IMPLANTABLE DEVICE | Site: HEART | Status: FUNCTIONAL

## 2025-08-12 RX ORDER — NALOXONE HCL 0.4 MG/ML
0.2 VIAL (ML) INJECTION AS NEEDED
Status: DISCONTINUED | OUTPATIENT
Start: 2025-08-12 | End: 2025-08-12 | Stop reason: HOSPADM

## 2025-08-12 RX ORDER — PHENYLEPHRINE HYDROCHLORIDE 10 MG/ML
INJECTION INTRAVENOUS AS NEEDED
Status: DISCONTINUED | OUTPATIENT
Start: 2025-08-12 | End: 2025-08-12 | Stop reason: SURG

## 2025-08-12 RX ORDER — SODIUM CHLORIDE 0.9 % (FLUSH) 0.9 %
3 SYRINGE (ML) INJECTION EVERY 12 HOURS SCHEDULED
Status: DISCONTINUED | OUTPATIENT
Start: 2025-08-12 | End: 2025-08-12 | Stop reason: HOSPADM

## 2025-08-12 RX ORDER — ONDANSETRON 2 MG/ML
4 INJECTION INTRAMUSCULAR; INTRAVENOUS EVERY 6 HOURS PRN
Status: DISCONTINUED | OUTPATIENT
Start: 2025-08-12 | End: 2025-08-13 | Stop reason: HOSPADM

## 2025-08-12 RX ORDER — DROPERIDOL 2.5 MG/ML
0.62 INJECTION, SOLUTION INTRAMUSCULAR; INTRAVENOUS
Status: DISCONTINUED | OUTPATIENT
Start: 2025-08-12 | End: 2025-08-12 | Stop reason: HOSPADM

## 2025-08-12 RX ORDER — SODIUM CHLORIDE 0.9 % (FLUSH) 0.9 %
3-10 SYRINGE (ML) INJECTION AS NEEDED
Status: DISCONTINUED | OUTPATIENT
Start: 2025-08-12 | End: 2025-08-12 | Stop reason: HOSPADM

## 2025-08-12 RX ORDER — HEPARIN SODIUM 1000 [USP'U]/ML
INJECTION, SOLUTION INTRAVENOUS; SUBCUTANEOUS AS NEEDED
Status: DISCONTINUED | OUTPATIENT
Start: 2025-08-12 | End: 2025-08-12 | Stop reason: SURG

## 2025-08-12 RX ORDER — SODIUM CHLORIDE 9 MG/ML
INJECTION, SOLUTION INTRAVENOUS CONTINUOUS PRN
Status: DISCONTINUED | OUTPATIENT
Start: 2025-08-12 | End: 2025-08-12 | Stop reason: SURG

## 2025-08-12 RX ORDER — FENTANYL CITRATE 50 UG/ML
50 INJECTION, SOLUTION INTRAMUSCULAR; INTRAVENOUS ONCE AS NEEDED
Status: COMPLETED | OUTPATIENT
Start: 2025-08-12 | End: 2025-08-12

## 2025-08-12 RX ORDER — EPHEDRINE SULFATE 50 MG/ML
5 INJECTION, SOLUTION INTRAVENOUS ONCE AS NEEDED
Status: DISCONTINUED | OUTPATIENT
Start: 2025-08-12 | End: 2025-08-12 | Stop reason: HOSPADM

## 2025-08-12 RX ORDER — FENTANYL CITRATE 50 UG/ML
25 INJECTION, SOLUTION INTRAMUSCULAR; INTRAVENOUS
Status: DISCONTINUED | OUTPATIENT
Start: 2025-08-12 | End: 2025-08-12 | Stop reason: HOSPADM

## 2025-08-12 RX ORDER — HYDRALAZINE HYDROCHLORIDE 20 MG/ML
5 INJECTION INTRAMUSCULAR; INTRAVENOUS
Status: DISCONTINUED | OUTPATIENT
Start: 2025-08-12 | End: 2025-08-12 | Stop reason: HOSPADM

## 2025-08-12 RX ORDER — SODIUM CHLORIDE, SODIUM LACTATE, POTASSIUM CHLORIDE, CALCIUM CHLORIDE 600; 310; 30; 20 MG/100ML; MG/100ML; MG/100ML; MG/100ML
9 INJECTION, SOLUTION INTRAVENOUS CONTINUOUS
Status: ACTIVE | OUTPATIENT
Start: 2025-08-12 | End: 2025-08-13

## 2025-08-12 RX ORDER — CHLORHEXIDINE GLUCONATE ORAL RINSE 1.2 MG/ML
15 SOLUTION DENTAL ONCE
Status: COMPLETED | OUTPATIENT
Start: 2025-08-12 | End: 2025-08-12

## 2025-08-12 RX ORDER — IOPAMIDOL 755 MG/ML
INJECTION, SOLUTION INTRAVASCULAR AS NEEDED
Status: DISCONTINUED | OUTPATIENT
Start: 2025-08-12 | End: 2025-08-12 | Stop reason: HOSPADM

## 2025-08-12 RX ORDER — SACUBITRIL AND VALSARTAN 24; 26 MG/1; MG/1
1 TABLET, FILM COATED ORAL 2 TIMES DAILY
Status: DISCONTINUED | OUTPATIENT
Start: 2025-08-12 | End: 2025-08-13 | Stop reason: HOSPADM

## 2025-08-12 RX ORDER — FAMOTIDINE 10 MG/ML
20 INJECTION, SOLUTION INTRAVENOUS ONCE
Status: COMPLETED | OUTPATIENT
Start: 2025-08-12 | End: 2025-08-12

## 2025-08-12 RX ORDER — ACETAMINOPHEN 325 MG/1
650 TABLET ORAL EVERY 4 HOURS PRN
Status: DISCONTINUED | OUTPATIENT
Start: 2025-08-12 | End: 2025-08-13 | Stop reason: HOSPADM

## 2025-08-12 RX ORDER — ONDANSETRON 2 MG/ML
4 INJECTION INTRAMUSCULAR; INTRAVENOUS ONCE AS NEEDED
Status: DISCONTINUED | OUTPATIENT
Start: 2025-08-12 | End: 2025-08-12 | Stop reason: HOSPADM

## 2025-08-12 RX ORDER — HYDROMORPHONE HYDROCHLORIDE 1 MG/ML
0.25 INJECTION, SOLUTION INTRAMUSCULAR; INTRAVENOUS; SUBCUTANEOUS
Status: DISCONTINUED | OUTPATIENT
Start: 2025-08-12 | End: 2025-08-12 | Stop reason: HOSPADM

## 2025-08-12 RX ORDER — LIDOCAINE HYDROCHLORIDE 20 MG/ML
INJECTION, SOLUTION INFILTRATION; PERINEURAL AS NEEDED
Status: DISCONTINUED | OUTPATIENT
Start: 2025-08-12 | End: 2025-08-12 | Stop reason: HOSPADM

## 2025-08-12 RX ORDER — HYDROCODONE BITARTRATE AND ACETAMINOPHEN 5; 325 MG/1; MG/1
1 TABLET ORAL ONCE AS NEEDED
Status: DISCONTINUED | OUTPATIENT
Start: 2025-08-12 | End: 2025-08-12 | Stop reason: HOSPADM

## 2025-08-12 RX ORDER — IPRATROPIUM BROMIDE AND ALBUTEROL SULFATE 2.5; .5 MG/3ML; MG/3ML
3 SOLUTION RESPIRATORY (INHALATION) ONCE AS NEEDED
Status: DISCONTINUED | OUTPATIENT
Start: 2025-08-12 | End: 2025-08-12 | Stop reason: HOSPADM

## 2025-08-12 RX ORDER — SPIRONOLACTONE 25 MG/1
25 TABLET ORAL DAILY
Status: DISCONTINUED | OUTPATIENT
Start: 2025-08-12 | End: 2025-08-13 | Stop reason: HOSPADM

## 2025-08-12 RX ORDER — FLUMAZENIL 0.1 MG/ML
0.2 INJECTION INTRAVENOUS AS NEEDED
Status: DISCONTINUED | OUTPATIENT
Start: 2025-08-12 | End: 2025-08-12 | Stop reason: HOSPADM

## 2025-08-12 RX ORDER — PROMETHAZINE HYDROCHLORIDE 25 MG/1
25 TABLET ORAL ONCE AS NEEDED
Status: DISCONTINUED | OUTPATIENT
Start: 2025-08-12 | End: 2025-08-12 | Stop reason: HOSPADM

## 2025-08-12 RX ORDER — ATROPINE SULFATE 0.4 MG/ML
0.4 INJECTION, SOLUTION INTRAMUSCULAR; INTRAVENOUS; SUBCUTANEOUS ONCE AS NEEDED
Status: DISCONTINUED | OUTPATIENT
Start: 2025-08-12 | End: 2025-08-12 | Stop reason: HOSPADM

## 2025-08-12 RX ORDER — ONDANSETRON 4 MG/1
4 TABLET, ORALLY DISINTEGRATING ORAL EVERY 6 HOURS PRN
Status: DISCONTINUED | OUTPATIENT
Start: 2025-08-12 | End: 2025-08-13 | Stop reason: HOSPADM

## 2025-08-12 RX ORDER — PROTAMINE SULFATE 10 MG/ML
INJECTION, SOLUTION INTRAVENOUS AS NEEDED
Status: DISCONTINUED | OUTPATIENT
Start: 2025-08-12 | End: 2025-08-12 | Stop reason: SURG

## 2025-08-12 RX ORDER — MAGNESIUM HYDROXIDE 1200 MG/15ML
LIQUID ORAL AS NEEDED
Status: DISCONTINUED | OUTPATIENT
Start: 2025-08-12 | End: 2025-08-12 | Stop reason: HOSPADM

## 2025-08-12 RX ORDER — LIDOCAINE HYDROCHLORIDE 10 MG/ML
0.5 INJECTION, SOLUTION INFILTRATION; PERINEURAL ONCE AS NEEDED
Status: DISCONTINUED | OUTPATIENT
Start: 2025-08-12 | End: 2025-08-12 | Stop reason: HOSPADM

## 2025-08-12 RX ORDER — NOREPINEPHRINE BITARTRATE 1 MG/ML
INJECTION, SOLUTION INTRAVENOUS CONTINUOUS PRN
Status: DISCONTINUED | OUTPATIENT
Start: 2025-08-12 | End: 2025-08-12 | Stop reason: SURG

## 2025-08-12 RX ORDER — SODIUM CHLORIDE 9 MG/ML
50 INJECTION, SOLUTION INTRAVENOUS CONTINUOUS
Status: ACTIVE | OUTPATIENT
Start: 2025-08-12 | End: 2025-08-12

## 2025-08-12 RX ORDER — NITROGLYCERIN 0.4 MG/1
0.4 TABLET SUBLINGUAL
Status: DISCONTINUED | OUTPATIENT
Start: 2025-08-12 | End: 2025-08-13 | Stop reason: HOSPADM

## 2025-08-12 RX ORDER — METOPROLOL SUCCINATE 25 MG/1
12.5 TABLET, EXTENDED RELEASE ORAL EVERY EVENING
Status: DISCONTINUED | OUTPATIENT
Start: 2025-08-12 | End: 2025-08-13 | Stop reason: HOSPADM

## 2025-08-12 RX ORDER — DIPHENHYDRAMINE HYDROCHLORIDE 50 MG/ML
12.5 INJECTION, SOLUTION INTRAMUSCULAR; INTRAVENOUS
Status: DISCONTINUED | OUTPATIENT
Start: 2025-08-12 | End: 2025-08-12 | Stop reason: HOSPADM

## 2025-08-12 RX ORDER — LABETALOL HYDROCHLORIDE 5 MG/ML
5 INJECTION, SOLUTION INTRAVENOUS
Status: DISCONTINUED | OUTPATIENT
Start: 2025-08-12 | End: 2025-08-12 | Stop reason: HOSPADM

## 2025-08-12 RX ORDER — HYDROCODONE BITARTRATE AND ACETAMINOPHEN 7.5; 325 MG/1; MG/1
1 TABLET ORAL EVERY 4 HOURS PRN
Status: DISCONTINUED | OUTPATIENT
Start: 2025-08-12 | End: 2025-08-12 | Stop reason: HOSPADM

## 2025-08-12 RX ORDER — PROMETHAZINE HYDROCHLORIDE 25 MG/1
25 SUPPOSITORY RECTAL ONCE AS NEEDED
Status: DISCONTINUED | OUTPATIENT
Start: 2025-08-12 | End: 2025-08-12 | Stop reason: HOSPADM

## 2025-08-12 RX ADMIN — SODIUM CHLORIDE 2 G: 900 INJECTION INTRAVENOUS at 10:43

## 2025-08-12 RX ADMIN — PROPOFOL 100 MCG/KG/MIN: 10 INJECTION, EMULSION INTRAVENOUS at 10:50

## 2025-08-12 RX ADMIN — CHLORHEXIDINE GLUCONATE 15 ML: 1.2 RINSE ORAL at 10:28

## 2025-08-12 RX ADMIN — SPIRONOLACTONE 25 MG: 25 TABLET ORAL at 17:25

## 2025-08-12 RX ADMIN — DEXMEDETOMIDINE 1 MCG/KG/HR: 200 INJECTION, SOLUTION INTRAVENOUS at 10:50

## 2025-08-12 RX ADMIN — PHENYLEPHRINE HYDROCHLORIDE 100 MCG: 10 INJECTION INTRAVENOUS at 12:02

## 2025-08-12 RX ADMIN — FAMOTIDINE 20 MG: 10 INJECTION INTRAVENOUS at 10:06

## 2025-08-12 RX ADMIN — HEPARIN SODIUM 13000 UNITS: 1000 INJECTION, SOLUTION INTRAVENOUS; SUBCUTANEOUS at 11:24

## 2025-08-12 RX ADMIN — SACUBITRIL AND VALSARTAN 1 TABLET: 24; 26 TABLET, FILM COATED ORAL at 20:35

## 2025-08-12 RX ADMIN — PHENYLEPHRINE HYDROCHLORIDE 50 MCG: 10 INJECTION INTRAVENOUS at 12:03

## 2025-08-12 RX ADMIN — PROTAMINE SULFATE 50 MG: 10 INJECTION, SOLUTION INTRAVENOUS at 11:59

## 2025-08-12 RX ADMIN — SODIUM CHLORIDE: 9 INJECTION, SOLUTION INTRAVENOUS at 10:39

## 2025-08-12 RX ADMIN — SODIUM CHLORIDE: 9 INJECTION, SOLUTION INTRAVENOUS at 10:49

## 2025-08-12 RX ADMIN — SODIUM CHLORIDE: 9 INJECTION, SOLUTION INTRAVENOUS at 11:24

## 2025-08-12 RX ADMIN — METOPROLOL SUCCINATE 12.5 MG: 25 TABLET, EXTENDED RELEASE ORAL at 17:25

## 2025-08-12 RX ADMIN — MUPIROCIN 1 APPLICATION: 20 OINTMENT TOPICAL at 10:28

## 2025-08-12 RX ADMIN — NOREPINEPHRINE BITARTRATE 0.02 MCG/KG/MIN: 1 SOLUTION INTRAVENOUS at 11:09

## 2025-08-12 RX ADMIN — FENTANYL CITRATE 50 MCG: 50 INJECTION, SOLUTION INTRAMUSCULAR; INTRAVENOUS at 10:04

## 2025-08-13 ENCOUNTER — APPOINTMENT (OUTPATIENT)
Dept: CARDIOLOGY | Facility: HOSPITAL | Age: 85
DRG: 267 | End: 2025-08-13
Payer: MEDICARE

## 2025-08-13 ENCOUNTER — READMISSION MANAGEMENT (OUTPATIENT)
Dept: CALL CENTER | Facility: HOSPITAL | Age: 85
End: 2025-08-13
Payer: MEDICARE

## 2025-08-13 VITALS
SYSTOLIC BLOOD PRESSURE: 123 MMHG | DIASTOLIC BLOOD PRESSURE: 79 MMHG | OXYGEN SATURATION: 96 % | TEMPERATURE: 98.2 F | HEIGHT: 70 IN | BODY MASS INDEX: 28.41 KG/M2 | WEIGHT: 198.41 LBS | RESPIRATION RATE: 18 BRPM | HEART RATE: 56 BPM

## 2025-08-13 LAB
ANION GAP SERPL CALCULATED.3IONS-SCNC: 8.2 MMOL/L (ref 5–15)
AORTIC DIMENSIONLESS INDEX: 0.31 (DI)
AV MEAN PRESS GRAD SYS DOP V1V2: 15.2 MMHG
AV VMAX SYS DOP: 259 CM/SEC
BH CV ECHO MEAS - AO MAX PG: 26.8 MMHG
BH CV ECHO MEAS - AO V2 VTI: 45 CM
BH CV ECHO MEAS - AVA(I,D): 1.01 CM2
BH CV ECHO MEAS - EDV(CUBED): 118.4 ML
BH CV ECHO MEAS - EDV(MOD-SP2): 199 ML
BH CV ECHO MEAS - EDV(MOD-SP4): 218 ML
BH CV ECHO MEAS - EF(MOD-SP2): 46.7 %
BH CV ECHO MEAS - EF(MOD-SP4): 47.2 %
BH CV ECHO MEAS - ESV(CUBED): 52.7 ML
BH CV ECHO MEAS - ESV(MOD-SP2): 106 ML
BH CV ECHO MEAS - ESV(MOD-SP4): 115 ML
BH CV ECHO MEAS - FS: 23.6 %
BH CV ECHO MEAS - IVS/LVPW: 0.98 CM
BH CV ECHO MEAS - IVSD: 1.14 CM
BH CV ECHO MEAS - LAT PEAK E' VEL: 8.8 CM/SEC
BH CV ECHO MEAS - LV DIASTOLIC VOL/BSA (35-75): 105.1 CM2
BH CV ECHO MEAS - LV MASS(C)D: 213.7 GRAMS
BH CV ECHO MEAS - LV MAX PG: 2.38 MMHG
BH CV ECHO MEAS - LV MEAN PG: 1.59 MMHG
BH CV ECHO MEAS - LV SYSTOLIC VOL/BSA (12-30): 55.5 CM2
BH CV ECHO MEAS - LV V1 MAX: 77.1 CM/SEC
BH CV ECHO MEAS - LV V1 VTI: 13.8 CM
BH CV ECHO MEAS - LVIDD: 4.9 CM
BH CV ECHO MEAS - LVIDS: 3.7 CM
BH CV ECHO MEAS - LVOT AREA: 3.3 CM2
BH CV ECHO MEAS - LVOT DIAM: 2.05 CM
BH CV ECHO MEAS - LVPWD: 1.16 CM
BH CV ECHO MEAS - MED PEAK E' VEL: 5.5 CM/SEC
BH CV ECHO MEAS - MV DEC SLOPE: 358.8 CM/SEC2
BH CV ECHO MEAS - MV DEC TIME: 0.38 SEC
BH CV ECHO MEAS - MV E MAX VEL: 112 CM/SEC
BH CV ECHO MEAS - MV MAX PG: 6.8 MMHG
BH CV ECHO MEAS - MV MEAN PG: 2.48 MMHG
BH CV ECHO MEAS - MV P1/2T: 107.3 MSEC
BH CV ECHO MEAS - MV V2 VTI: 33.9 CM
BH CV ECHO MEAS - MVA(P1/2T): 2.05 CM2
BH CV ECHO MEAS - MVA(VTI): 1.34 CM2
BH CV ECHO MEAS - PA ACC TIME: 0.13 SEC
BH CV ECHO MEAS - PA V2 MAX: 88.1 CM/SEC
BH CV ECHO MEAS - PULM DIAS VEL: 28.8 CM/SEC
BH CV ECHO MEAS - PULM S/D: 0.65
BH CV ECHO MEAS - PULM SYS VEL: 18.9 CM/SEC
BH CV ECHO MEAS - RV MAX PG: 2.15 MMHG
BH CV ECHO MEAS - RV V1 MAX: 73.3 CM/SEC
BH CV ECHO MEAS - RV V1 VTI: 14.1 CM
BH CV ECHO MEAS - SV(LVOT): 45.3 ML
BH CV ECHO MEAS - SV(MOD-SP2): 93 ML
BH CV ECHO MEAS - SV(MOD-SP4): 103 ML
BH CV ECHO MEAS - SVI(LVOT): 21.9 ML/M2
BH CV ECHO MEAS - SVI(MOD-SP2): 44.9 ML/M2
BH CV ECHO MEAS - SVI(MOD-SP4): 49.7 ML/M2
BH CV ECHO MEASUREMENTS AVERAGE E/E' RATIO: 15.66
BH CV XLRA - RV BASE: 2.34 CM
BH CV XLRA - RV MID: 2.19 CM
BH CV XLRA - TDI S': 10.2 CM/SEC
BUN SERPL-MCNC: 20 MG/DL (ref 8–23)
BUN/CREAT SERPL: 14.4 (ref 7–25)
CALCIUM SPEC-SCNC: 8.6 MG/DL (ref 8.6–10.5)
CHLORIDE SERPL-SCNC: 112 MMOL/L (ref 98–107)
CO2 SERPL-SCNC: 20.8 MMOL/L (ref 22–29)
CREAT SERPL-MCNC: 1.39 MG/DL (ref 0.76–1.27)
DEPRECATED RDW RBC AUTO: 41.4 FL (ref 37–54)
EGFRCR SERPLBLD CKD-EPI 2021: 50.3 ML/MIN/1.73
ERYTHROCYTE [DISTWIDTH] IN BLOOD BY AUTOMATED COUNT: 11.9 % (ref 12.3–15.4)
GLUCOSE SERPL-MCNC: 100 MG/DL (ref 65–99)
HCT VFR BLD AUTO: 43.9 % (ref 37.5–51)
HGB BLD-MCNC: 14.4 G/DL (ref 13–17.7)
LEFT ATRIUM VOLUME INDEX: 57 ML/M2
LV EF BIPLANE MOD: 48.6 %
MCH RBC QN AUTO: 31 PG (ref 26.6–33)
MCHC RBC AUTO-ENTMCNC: 32.8 G/DL (ref 31.5–35.7)
MCV RBC AUTO: 94.6 FL (ref 79–97)
PLATELET # BLD AUTO: 114 10*3/MM3 (ref 140–450)
PMV BLD AUTO: 9.4 FL (ref 6–12)
POTASSIUM SERPL-SCNC: 4.1 MMOL/L (ref 3.5–5.2)
QT INTERVAL: 430 MS
QTC INTERVAL: 433 MS
RBC # BLD AUTO: 4.64 10*6/MM3 (ref 4.14–5.8)
SODIUM SERPL-SCNC: 141 MMOL/L (ref 136–145)
WBC NRBC COR # BLD AUTO: 6.23 10*3/MM3 (ref 3.4–10.8)

## 2025-08-13 PROCEDURE — 99232 SBSQ HOSP IP/OBS MODERATE 35: CPT | Performed by: INTERNAL MEDICINE

## 2025-08-13 PROCEDURE — 85027 COMPLETE CBC AUTOMATED: CPT | Performed by: INTERNAL MEDICINE

## 2025-08-13 PROCEDURE — 93306 TTE W/DOPPLER COMPLETE: CPT

## 2025-08-13 PROCEDURE — 80048 BASIC METABOLIC PNL TOTAL CA: CPT | Performed by: INTERNAL MEDICINE

## 2025-08-13 PROCEDURE — 25510000001 PERFLUTREN 6.52 MG/ML SUSPENSION 2 ML VIAL: Performed by: INTERNAL MEDICINE

## 2025-08-13 PROCEDURE — 93010 ELECTROCARDIOGRAM REPORT: CPT | Performed by: INTERNAL MEDICINE

## 2025-08-13 PROCEDURE — 99238 HOSP IP/OBS DSCHRG MGMT 30/<: CPT | Performed by: PHYSICIAN ASSISTANT

## 2025-08-13 PROCEDURE — 93005 ELECTROCARDIOGRAM TRACING: CPT | Performed by: INTERNAL MEDICINE

## 2025-08-13 PROCEDURE — 93306 TTE W/DOPPLER COMPLETE: CPT | Performed by: INTERNAL MEDICINE

## 2025-08-13 RX ADMIN — SACUBITRIL AND VALSARTAN 1 TABLET: 24; 26 TABLET, FILM COATED ORAL at 09:55

## 2025-08-13 RX ADMIN — PERFLUTREN 2 ML: 6.52 INJECTION, SUSPENSION INTRAVENOUS at 09:36

## 2025-08-13 RX ADMIN — SPIRONOLACTONE 25 MG: 25 TABLET ORAL at 09:55

## 2025-08-14 ENCOUNTER — APPOINTMENT (OUTPATIENT)
Dept: GENERAL RADIOLOGY | Facility: HOSPITAL | Age: 85
DRG: 229 | End: 2025-08-14
Payer: MEDICARE

## 2025-08-14 ENCOUNTER — TRANSITIONAL CARE MANAGEMENT TELEPHONE ENCOUNTER (OUTPATIENT)
Dept: CALL CENTER | Facility: HOSPITAL | Age: 85
End: 2025-08-14
Payer: MEDICARE

## 2025-08-14 ENCOUNTER — HOSPITAL ENCOUNTER (INPATIENT)
Facility: HOSPITAL | Age: 85
LOS: 1 days | Discharge: HOME | DRG: 229 | End: 2025-08-16
Attending: EMERGENCY MEDICINE | Admitting: INTERNAL MEDICINE
Payer: MEDICARE

## 2025-08-14 DIAGNOSIS — R55 SYNCOPE AND COLLAPSE: ICD-10-CM

## 2025-08-14 DIAGNOSIS — I45.9 HEART BLOCK: Primary | ICD-10-CM

## 2025-08-14 DIAGNOSIS — I45.5 SINUS PAUSE: ICD-10-CM

## 2025-08-14 LAB
ALBUMIN SERPL-MCNC: 3.5 G/DL (ref 3.5–5.2)
ALBUMIN/GLOB SERPL: 1.2 G/DL
ALP SERPL-CCNC: 57 U/L (ref 39–117)
ALT SERPL W P-5'-P-CCNC: 13 U/L (ref 1–41)
ANION GAP SERPL CALCULATED.3IONS-SCNC: 17 MMOL/L (ref 5–15)
AST SERPL-CCNC: 27 U/L (ref 1–40)
BASOPHILS # BLD AUTO: 0.02 10*3/MM3 (ref 0–0.2)
BASOPHILS NFR BLD AUTO: 0.2 % (ref 0–1.5)
BILIRUB SERPL-MCNC: 0.8 MG/DL (ref 0–1.2)
BUN SERPL-MCNC: 22 MG/DL (ref 8–23)
BUN/CREAT SERPL: 15.8 (ref 7–25)
CALCIUM SPEC-SCNC: 8.7 MG/DL (ref 8.6–10.5)
CHLORIDE SERPL-SCNC: 106 MMOL/L (ref 98–107)
CO2 SERPL-SCNC: 14 MMOL/L (ref 22–29)
CREAT SERPL-MCNC: 1.39 MG/DL (ref 0.76–1.27)
DEPRECATED RDW RBC AUTO: 42.9 FL (ref 37–54)
EGFRCR SERPLBLD CKD-EPI 2021: 50.3 ML/MIN/1.73
EOSINOPHIL # BLD AUTO: 0.01 10*3/MM3 (ref 0–0.4)
EOSINOPHIL NFR BLD AUTO: 0.1 % (ref 0.3–6.2)
ERYTHROCYTE [DISTWIDTH] IN BLOOD BY AUTOMATED COUNT: 11.9 % (ref 12.3–15.4)
GLOBULIN UR ELPH-MCNC: 2.9 GM/DL
GLUCOSE SERPL-MCNC: 241 MG/DL (ref 65–99)
HCT VFR BLD AUTO: 47.9 % (ref 37.5–51)
HGB BLD-MCNC: 15.3 G/DL (ref 13–17.7)
HOLD SPECIMEN: NORMAL
IMM GRANULOCYTES # BLD AUTO: 0.04 10*3/MM3 (ref 0–0.05)
IMM GRANULOCYTES NFR BLD AUTO: 0.4 % (ref 0–0.5)
INR PPP: 1.25 (ref 0.9–1.1)
LYMPHOCYTES # BLD AUTO: 0.84 10*3/MM3 (ref 0.7–3.1)
LYMPHOCYTES NFR BLD AUTO: 7.7 % (ref 19.6–45.3)
MAGNESIUM SERPL-MCNC: 2.2 MG/DL (ref 1.6–2.4)
MCH RBC QN AUTO: 31.5 PG (ref 26.6–33)
MCHC RBC AUTO-ENTMCNC: 31.9 G/DL (ref 31.5–35.7)
MCV RBC AUTO: 98.8 FL (ref 79–97)
MONOCYTES # BLD AUTO: 0.46 10*3/MM3 (ref 0.1–0.9)
MONOCYTES NFR BLD AUTO: 4.2 % (ref 5–12)
NEUTROPHILS NFR BLD AUTO: 87.4 % (ref 42.7–76)
NEUTROPHILS NFR BLD AUTO: 9.48 10*3/MM3 (ref 1.7–7)
NRBC BLD AUTO-RTO: 0 /100 WBC (ref 0–0.2)
PLATELET # BLD AUTO: 73 10*3/MM3 (ref 140–450)
PMV BLD AUTO: 9.7 FL (ref 6–12)
POTASSIUM SERPL-SCNC: 4.1 MMOL/L (ref 3.5–5.2)
PROT SERPL-MCNC: 6.4 G/DL (ref 6–8.5)
PROTHROMBIN TIME: 15.7 SECONDS (ref 11.7–14.2)
RBC # BLD AUTO: 4.85 10*6/MM3 (ref 4.14–5.8)
SODIUM SERPL-SCNC: 137 MMOL/L (ref 136–145)
TROPONIN T SERPL HS-MCNC: 24 NG/L
WBC NRBC COR # BLD AUTO: 10.85 10*3/MM3 (ref 3.4–10.8)
WHOLE BLOOD HOLD COAG: NORMAL
WHOLE BLOOD HOLD SPECIMEN: NORMAL

## 2025-08-14 PROCEDURE — C1894 INTRO/SHEATH, NON-LASER: HCPCS | Performed by: INTERNAL MEDICINE

## 2025-08-14 PROCEDURE — 25810000003 SODIUM CHLORIDE 0.9 % SOLUTION 250 ML FLEX CONT: Performed by: EMERGENCY MEDICINE

## 2025-08-14 PROCEDURE — 85025 COMPLETE CBC W/AUTO DIFF WBC: CPT | Performed by: EMERGENCY MEDICINE

## 2025-08-14 PROCEDURE — 33210 INSERT ELECTRD/PM CATH SNGL: CPT | Performed by: INTERNAL MEDICINE

## 2025-08-14 PROCEDURE — 85610 PROTHROMBIN TIME: CPT | Performed by: EMERGENCY MEDICINE

## 2025-08-14 PROCEDURE — 93005 ELECTROCARDIOGRAM TRACING: CPT | Performed by: EMERGENCY MEDICINE

## 2025-08-14 PROCEDURE — 25010000002 FENTANYL CITRATE (PF) 50 MCG/ML SOLUTION: Performed by: INTERNAL MEDICINE

## 2025-08-14 PROCEDURE — 99223 1ST HOSP IP/OBS HIGH 75: CPT | Performed by: INTERNAL MEDICINE

## 2025-08-14 PROCEDURE — 25010000002 LIDOCAINE 2% SOLUTION: Performed by: INTERNAL MEDICINE

## 2025-08-14 PROCEDURE — 80053 COMPREHEN METABOLIC PANEL: CPT | Performed by: EMERGENCY MEDICINE

## 2025-08-14 PROCEDURE — 25010000002 MIDAZOLAM PER 1 MG: Performed by: INTERNAL MEDICINE

## 2025-08-14 PROCEDURE — 93010 ELECTROCARDIOGRAM REPORT: CPT | Performed by: INTERNAL MEDICINE

## 2025-08-14 PROCEDURE — 84484 ASSAY OF TROPONIN QUANT: CPT | Performed by: EMERGENCY MEDICINE

## 2025-08-14 PROCEDURE — 71045 X-RAY EXAM CHEST 1 VIEW: CPT

## 2025-08-14 PROCEDURE — 5A1223Z PERFORMANCE OF CARDIAC PACING, CONTINUOUS: ICD-10-PCS | Performed by: INTERNAL MEDICINE

## 2025-08-14 PROCEDURE — 94799 UNLISTED PULMONARY SVC/PX: CPT

## 2025-08-14 PROCEDURE — 83735 ASSAY OF MAGNESIUM: CPT | Performed by: EMERGENCY MEDICINE

## 2025-08-14 PROCEDURE — 99291 CRITICAL CARE FIRST HOUR: CPT

## 2025-08-14 RX ORDER — SODIUM CHLORIDE 0.9 % (FLUSH) 0.9 %
10 SYRINGE (ML) INJECTION AS NEEDED
Status: DISCONTINUED | OUTPATIENT
Start: 2025-08-14 | End: 2025-08-16 | Stop reason: HOSPADM

## 2025-08-14 RX ORDER — MIDAZOLAM HYDROCHLORIDE 1 MG/ML
INJECTION, SOLUTION INTRAMUSCULAR; INTRAVENOUS
Status: DISCONTINUED | OUTPATIENT
Start: 2025-08-14 | End: 2025-08-14 | Stop reason: HOSPADM

## 2025-08-14 RX ORDER — SACUBITRIL AND VALSARTAN 24; 26 MG/1; MG/1
1 TABLET, FILM COATED ORAL EVERY 12 HOURS SCHEDULED
Status: DISCONTINUED | OUTPATIENT
Start: 2025-08-14 | End: 2025-08-16 | Stop reason: HOSPADM

## 2025-08-14 RX ORDER — LIDOCAINE HYDROCHLORIDE 20 MG/ML
INJECTION, SOLUTION INFILTRATION; PERINEURAL
Status: DISCONTINUED | OUTPATIENT
Start: 2025-08-14 | End: 2025-08-14 | Stop reason: HOSPADM

## 2025-08-14 RX ORDER — TAMSULOSIN HYDROCHLORIDE 0.4 MG/1
0.4 CAPSULE ORAL DAILY
Status: DISCONTINUED | OUTPATIENT
Start: 2025-08-14 | End: 2025-08-16 | Stop reason: HOSPADM

## 2025-08-14 RX ORDER — FENTANYL CITRATE 50 UG/ML
INJECTION, SOLUTION INTRAMUSCULAR; INTRAVENOUS
Status: DISCONTINUED | OUTPATIENT
Start: 2025-08-14 | End: 2025-08-14 | Stop reason: HOSPADM

## 2025-08-14 RX ORDER — SPIRONOLACTONE 25 MG/1
25 TABLET ORAL DAILY
Status: DISCONTINUED | OUTPATIENT
Start: 2025-08-14 | End: 2025-08-16 | Stop reason: HOSPADM

## 2025-08-14 RX ORDER — MULTIVITAMIN WITH IRON
1000 TABLET ORAL DAILY
Status: DISCONTINUED | OUTPATIENT
Start: 2025-08-14 | End: 2025-08-16 | Stop reason: HOSPADM

## 2025-08-14 RX ADMIN — ISOPROTERENOL HYDROCHLORIDE 1 MCG/MIN: 0.2 INJECTION, SOLUTION INTRACARDIAC; INTRAMUSCULAR; INTRAVENOUS; SUBCUTANEOUS at 19:27

## 2025-08-15 ENCOUNTER — APPOINTMENT (OUTPATIENT)
Dept: CARDIOLOGY | Facility: HOSPITAL | Age: 85
DRG: 229 | End: 2025-08-15
Payer: MEDICARE

## 2025-08-15 PROBLEM — R55 SYNCOPE AND COLLAPSE: Status: ACTIVE | Noted: 2025-08-15

## 2025-08-15 PROBLEM — I45.9 HEART BLOCK: Status: ACTIVE | Noted: 2025-08-14

## 2025-08-15 LAB
AORTIC DIMENSIONLESS INDEX: 0.41 (DI)
AV MEAN PRESS GRAD SYS DOP V1V2: 14 MMHG
AV VMAX SYS DOP: 238.9 CM/SEC
BH CV ECHO MEAS - AO MAX PG: 22.8 MMHG
BH CV ECHO MEAS - AO V2 VTI: 50.9 CM
BH CV ECHO MEAS - EDV(MOD-SP2): 154 ML
BH CV ECHO MEAS - EDV(MOD-SP4): 197 ML
BH CV ECHO MEAS - EF(MOD-SP2): 49.4 %
BH CV ECHO MEAS - EF(MOD-SP4): 39.6 %
BH CV ECHO MEAS - ESV(MOD-SP2): 78 ML
BH CV ECHO MEAS - ESV(MOD-SP4): 119 ML
BH CV ECHO MEAS - LAT PEAK E' VEL: 8.6 CM/SEC
BH CV ECHO MEAS - LV DIASTOLIC VOL/BSA (35-75): 96.2 CM2
BH CV ECHO MEAS - LV MAX PG: 4 MMHG
BH CV ECHO MEAS - LV MEAN PG: 2.48 MMHG
BH CV ECHO MEAS - LV SYSTOLIC VOL/BSA (12-30): 58.1 CM2
BH CV ECHO MEAS - LV V1 MAX: 100.4 CM/SEC
BH CV ECHO MEAS - LV V1 VTI: 21.1 CM
BH CV ECHO MEAS - MED PEAK E' VEL: 6.2 CM/SEC
BH CV ECHO MEAS - MR MAX PG: 19.6 MMHG
BH CV ECHO MEAS - MR MAX VEL: 221.3 CM/SEC
BH CV ECHO MEAS - MV DEC SLOPE: 355.5 CM/SEC2
BH CV ECHO MEAS - MV DEC TIME: 0.26 SEC
BH CV ECHO MEAS - MV E MAX VEL: 102 CM/SEC
BH CV ECHO MEAS - MV MAX PG: 4.9 MMHG
BH CV ECHO MEAS - MV MEAN PG: 1.96 MMHG
BH CV ECHO MEAS - MV P1/2T: 95.2 MSEC
BH CV ECHO MEAS - MV V2 VTI: 30.8 CM
BH CV ECHO MEAS - MVA(P1/2T): 2.31 CM2
BH CV ECHO MEAS - SV(MOD-SP2): 76 ML
BH CV ECHO MEAS - SV(MOD-SP4): 78 ML
BH CV ECHO MEAS - SVI(MOD-SP2): 37.1 ML/M2
BH CV ECHO MEAS - SVI(MOD-SP4): 38.1 ML/M2
BH CV ECHO MEASUREMENTS AVERAGE E/E' RATIO: 13.78
BH CV XLRA - TDI S': 8.9 CM/SEC
LV EF BIPLANE MOD: 45.5 %
QT INTERVAL: 519 MS
QT INTERVAL: 533 MS
QTC INTERVAL: 532 MS
QTC INTERVAL: 541 MS

## 2025-08-15 PROCEDURE — 25010000002 CEFAZOLIN PER 500 MG: Performed by: INTERNAL MEDICINE

## 2025-08-15 PROCEDURE — 93308 TTE F-UP OR LMTD: CPT

## 2025-08-15 PROCEDURE — 25010000002 HEPARIN (PORCINE) PER 1000 UNITS: Performed by: INTERNAL MEDICINE

## 2025-08-15 PROCEDURE — 33274 TCAT INSJ/RPL PERM LDLS PM: CPT | Performed by: INTERNAL MEDICINE

## 2025-08-15 PROCEDURE — 93308 TTE F-UP OR LMTD: CPT | Performed by: INTERNAL MEDICINE

## 2025-08-15 PROCEDURE — 25010000002 LIDOCAINE-EPINEPHRINE (PF) 1 %-1:200000 SOLUTION: Performed by: INTERNAL MEDICINE

## 2025-08-15 PROCEDURE — 25010000002 FENTANYL CITRATE (PF) 50 MCG/ML SOLUTION: Performed by: INTERNAL MEDICINE

## 2025-08-15 PROCEDURE — 25510000001 IOPAMIDOL PER 1 ML: Performed by: INTERNAL MEDICINE

## 2025-08-15 PROCEDURE — 93321 DOPPLER ECHO F-UP/LMTD STD: CPT

## 2025-08-15 PROCEDURE — 93010 ELECTROCARDIOGRAM REPORT: CPT | Performed by: INTERNAL MEDICINE

## 2025-08-15 PROCEDURE — C1786 PMKR, SINGLE, RATE-RESP: HCPCS | Performed by: INTERNAL MEDICINE

## 2025-08-15 PROCEDURE — 93321 DOPPLER ECHO F-UP/LMTD STD: CPT | Performed by: INTERNAL MEDICINE

## 2025-08-15 PROCEDURE — 25010000002 MIDAZOLAM PER 1 MG: Performed by: INTERNAL MEDICINE

## 2025-08-15 PROCEDURE — C1769 GUIDE WIRE: HCPCS | Performed by: INTERNAL MEDICINE

## 2025-08-15 PROCEDURE — 93325 DOPPLER ECHO COLOR FLOW MAPG: CPT

## 2025-08-15 PROCEDURE — C1894 INTRO/SHEATH, NON-LASER: HCPCS | Performed by: INTERNAL MEDICINE

## 2025-08-15 PROCEDURE — 25510000001 PERFLUTREN 6.52 MG/ML SUSPENSION 2 ML VIAL: Performed by: INTERNAL MEDICINE

## 2025-08-15 PROCEDURE — 93325 DOPPLER ECHO COLOR FLOW MAPG: CPT | Performed by: INTERNAL MEDICINE

## 2025-08-15 PROCEDURE — X2HK3V9 INSERTION OF DUAL-CHAMBER INTRACARDIAC PACEMAKER INTO RIGHT VENTRICLE, PERCUTANEOUS APPROACH, NEW TECHNOLOGY GROUP 9: ICD-10-PCS | Performed by: INTERNAL MEDICINE

## 2025-08-15 PROCEDURE — C1760 CLOSURE DEV, VASC: HCPCS | Performed by: INTERNAL MEDICINE

## 2025-08-15 PROCEDURE — 93005 ELECTROCARDIOGRAM TRACING: CPT

## 2025-08-15 PROCEDURE — 99223 1ST HOSP IP/OBS HIGH 75: CPT

## 2025-08-15 DEVICE — SYS PACE MICRA LD/LESS VR2: Type: IMPLANTABLE DEVICE | Site: CHEST | Status: FUNCTIONAL

## 2025-08-15 RX ORDER — HEPARIN SODIUM 1000 [USP'U]/ML
INJECTION, SOLUTION INTRAVENOUS; SUBCUTANEOUS
Status: DISCONTINUED | OUTPATIENT
Start: 2025-08-15 | End: 2025-08-15 | Stop reason: HOSPADM

## 2025-08-15 RX ORDER — METHOHEXITAL IN WATER/PF 100MG/10ML
SYRINGE (ML) INTRAVENOUS
Status: DISCONTINUED | OUTPATIENT
Start: 2025-08-15 | End: 2025-08-15 | Stop reason: HOSPADM

## 2025-08-15 RX ORDER — MIDAZOLAM HYDROCHLORIDE 1 MG/ML
INJECTION, SOLUTION INTRAMUSCULAR; INTRAVENOUS
Status: DISCONTINUED | OUTPATIENT
Start: 2025-08-15 | End: 2025-08-15 | Stop reason: HOSPADM

## 2025-08-15 RX ORDER — IOPAMIDOL 755 MG/ML
INJECTION, SOLUTION INTRAVASCULAR
Status: DISCONTINUED | OUTPATIENT
Start: 2025-08-15 | End: 2025-08-15 | Stop reason: HOSPADM

## 2025-08-15 RX ORDER — FENTANYL CITRATE 50 UG/ML
INJECTION, SOLUTION INTRAMUSCULAR; INTRAVENOUS
Status: DISCONTINUED | OUTPATIENT
Start: 2025-08-15 | End: 2025-08-15 | Stop reason: HOSPADM

## 2025-08-15 RX ORDER — SODIUM CHLORIDE 9 MG/ML
INJECTION, SOLUTION INTRAVENOUS
Status: COMPLETED | OUTPATIENT
Start: 2025-08-15 | End: 2025-08-15

## 2025-08-15 RX ADMIN — SPIRONOLACTONE 25 MG: 25 TABLET ORAL at 08:26

## 2025-08-15 RX ADMIN — Medication 1000 MCG: at 08:25

## 2025-08-15 RX ADMIN — TAMSULOSIN HYDROCHLORIDE 0.4 MG: 0.4 CAPSULE ORAL at 08:25

## 2025-08-15 RX ADMIN — SACUBITRIL AND VALSARTAN 1 TABLET: 24; 26 TABLET, FILM COATED ORAL at 21:56

## 2025-08-15 RX ADMIN — SACUBITRIL AND VALSARTAN 1 TABLET: 24; 26 TABLET, FILM COATED ORAL at 08:25

## 2025-08-15 RX ADMIN — PERFLUTREN 2 ML: 6.52 INJECTION, SUSPENSION INTRAVENOUS at 09:25

## 2025-08-16 VITALS
HEIGHT: 69 IN | BODY MASS INDEX: 29.13 KG/M2 | OXYGEN SATURATION: 96 % | HEART RATE: 64 BPM | RESPIRATION RATE: 17 BRPM | TEMPERATURE: 98 F | WEIGHT: 196.65 LBS | SYSTOLIC BLOOD PRESSURE: 117 MMHG | DIASTOLIC BLOOD PRESSURE: 83 MMHG

## 2025-08-16 LAB
QT INTERVAL: 505 MS
QTC INTERVAL: 537 MS

## 2025-08-16 PROCEDURE — 99024 POSTOP FOLLOW-UP VISIT: CPT

## 2025-08-16 RX ADMIN — SPIRONOLACTONE 25 MG: 25 TABLET ORAL at 08:27

## 2025-08-16 RX ADMIN — SACUBITRIL AND VALSARTAN 1 TABLET: 24; 26 TABLET, FILM COATED ORAL at 08:27

## 2025-08-16 RX ADMIN — TAMSULOSIN HYDROCHLORIDE 0.4 MG: 0.4 CAPSULE ORAL at 08:27

## 2025-08-16 RX ADMIN — Medication 1000 MCG: at 08:27

## 2025-08-18 ENCOUNTER — TRANSITIONAL CARE MANAGEMENT TELEPHONE ENCOUNTER (OUTPATIENT)
Dept: CALL CENTER | Facility: HOSPITAL | Age: 85
End: 2025-08-18
Payer: MEDICARE

## 2025-08-18 ENCOUNTER — READMISSION MANAGEMENT (OUTPATIENT)
Dept: CALL CENTER | Facility: HOSPITAL | Age: 85
End: 2025-08-18
Payer: MEDICARE

## 2025-08-19 ENCOUNTER — OFFICE VISIT (OUTPATIENT)
Dept: INTERNAL MEDICINE | Facility: CLINIC | Age: 85
End: 2025-08-19
Payer: MEDICARE

## 2025-08-19 VITALS
DIASTOLIC BLOOD PRESSURE: 72 MMHG | OXYGEN SATURATION: 98 % | SYSTOLIC BLOOD PRESSURE: 112 MMHG | TEMPERATURE: 97.7 F | RESPIRATION RATE: 16 BRPM | BODY MASS INDEX: 29.03 KG/M2 | HEART RATE: 70 BPM | WEIGHT: 196 LBS | HEIGHT: 69 IN

## 2025-08-19 DIAGNOSIS — I10 ESSENTIAL HYPERTENSION: ICD-10-CM

## 2025-08-19 DIAGNOSIS — I50.9 CONGESTIVE HEART FAILURE, UNSPECIFIED HF CHRONICITY, UNSPECIFIED HEART FAILURE TYPE: Primary | ICD-10-CM

## 2025-08-19 DIAGNOSIS — Z95.2 S/P TAVR (TRANSCATHETER AORTIC VALVE REPLACEMENT): ICD-10-CM

## 2025-08-19 DIAGNOSIS — E78.5 DYSLIPIDEMIA: ICD-10-CM

## 2025-08-19 DIAGNOSIS — Z95.0 S/P PLACEMENT OF CARDIAC PACEMAKER: ICD-10-CM

## 2025-08-19 DIAGNOSIS — J30.9 ALLERGIC RHINITIS, UNSPECIFIED SEASONALITY, UNSPECIFIED TRIGGER: ICD-10-CM

## 2025-08-19 DIAGNOSIS — F41.9 ANXIETY: ICD-10-CM

## 2025-08-19 DIAGNOSIS — F51.01 PRIMARY INSOMNIA: ICD-10-CM

## 2025-08-19 RX ORDER — METOPROLOL SUCCINATE 25 MG/1
12.5 TABLET, EXTENDED RELEASE ORAL DAILY
Qty: 90 TABLET | Refills: 3 | Status: SHIPPED | OUTPATIENT
Start: 2025-08-19

## 2025-08-19 RX ORDER — TADALAFIL 5 MG/1
5 TABLET ORAL DAILY PRN
Qty: 30 TABLET | Refills: 5 | Status: SHIPPED | OUTPATIENT
Start: 2025-08-19

## 2025-08-19 RX ORDER — ALPRAZOLAM 1 MG/1
1 TABLET ORAL NIGHTLY PRN
Qty: 90 TABLET | Refills: 1 | Status: SHIPPED | OUTPATIENT
Start: 2025-08-19

## 2025-08-19 RX ORDER — SPIRONOLACTONE 25 MG/1
25 TABLET ORAL DAILY
Qty: 90 TABLET | Refills: 3 | Status: SHIPPED | OUTPATIENT
Start: 2025-08-19

## 2025-08-19 RX ORDER — TAMSULOSIN HYDROCHLORIDE 0.4 MG/1
1 CAPSULE ORAL DAILY
Qty: 90 CAPSULE | Refills: 3 | Status: SHIPPED | OUTPATIENT
Start: 2025-08-19

## 2025-08-20 ENCOUNTER — OFFICE VISIT (OUTPATIENT)
Age: 85
End: 2025-08-20
Payer: MEDICARE

## 2025-08-20 VITALS
WEIGHT: 194.5 LBS | HEART RATE: 93 BPM | BODY MASS INDEX: 28.81 KG/M2 | HEIGHT: 69 IN | SYSTOLIC BLOOD PRESSURE: 128 MMHG | DIASTOLIC BLOOD PRESSURE: 74 MMHG

## 2025-08-20 DIAGNOSIS — I48.21 PERMANENT ATRIAL FIBRILLATION: Chronic | ICD-10-CM

## 2025-08-20 DIAGNOSIS — I45.9 HEART BLOCK: ICD-10-CM

## 2025-08-20 DIAGNOSIS — I35.0 NONRHEUMATIC AORTIC VALVE STENOSIS: Primary | ICD-10-CM

## 2025-08-20 DIAGNOSIS — Z95.2 S/P TAVR (TRANSCATHETER AORTIC VALVE REPLACEMENT): ICD-10-CM

## 2025-08-20 DIAGNOSIS — I71.21 ANEURYSM OF ASCENDING AORTA WITHOUT RUPTURE: ICD-10-CM

## 2025-08-22 ENCOUNTER — TELEPHONE (OUTPATIENT)
Dept: INTERNAL MEDICINE | Facility: CLINIC | Age: 85
End: 2025-08-22
Payer: MEDICARE

## (undated) DEVICE — DGW .035 FC J3MM 260CM TEF: Brand: EMERALD

## (undated) DEVICE — RADIFOCUS GLIDEWIRE: Brand: GLIDEWIRE

## (undated) DEVICE — PINNACLE INTRODUCER SHEATH: Brand: PINNACLE

## (undated) DEVICE — DIL COONS/TPR .038IN 22F 20CM

## (undated) DEVICE — DECANTER BAG 9": Brand: MEDLINE INDUSTRIES, INC.

## (undated) DEVICE — Device

## (undated) DEVICE — TBG PRESS 96IN M/F ROT BRAID: Brand: MEDLINE INDUSTRIES, INC.

## (undated) DEVICE — GW AMPLATZ SUPERSTIFF 3MM J/TP .035IN 145CM

## (undated) DEVICE — TAVR: Brand: MEDLINE INDUSTRIES, INC.

## (undated) DEVICE — PERCLOSE™ PROSTYLE™ SUTURE-MEDIATED CLOSURE AND REPAIR SYSTEM: Brand: PERCLOSE™ PROSTYLE™

## (undated) DEVICE — CATH DIAG IMPULSE AL1 6F 100CM

## (undated) DEVICE — DISPOSABLE ADAPTER

## (undated) DEVICE — TRY INTRO PERC 6F

## (undated) DEVICE — SNAP KAP: Brand: UNBRANDED

## (undated) DEVICE — SUT SILK 2 SUTUPAK TIE 60IN SA8H 2STRAND

## (undated) DEVICE — KT 2 CONTRST ADMIN

## (undated) DEVICE — SHORT SPIKE VENTED W/3-WAY SC: Brand: MEDLINE INDUSTRIES, INC.

## (undated) DEVICE — EQUIPMENT COVER BAG TYPE 48” X 36” (122CM X 91CM): Brand: EQUIPMENT COVER BAG TYPE

## (undated) DEVICE — DRAPE,REIN 53X77,STERILE: Brand: MEDLINE

## (undated) DEVICE — SHEATH INTRO MICRA HC 23F 55.7CM

## (undated) DEVICE — GLV SURG BIOGEL LTX PF 7 1/2

## (undated) DEVICE — GW AMPLATZER SS .035 1.5MM J 260CM

## (undated) DEVICE — TR BAND RADIAL ARTERY COMPRESSION DEVICE: Brand: TR BAND

## (undated) DEVICE — 3M™ IOBAN™ 2 ANTIMICROBIAL INCISE DRAPE 6650EZ: Brand: IOBAN™ 2

## (undated) DEVICE — PK CATH CARD 40

## (undated) DEVICE — SENSR CERBRL O2 PK/2

## (undated) DEVICE — CATH DIAG IMPULSE FR4 5F 100CM

## (undated) DEVICE — DRP INCISE CARDIO W/ADHS 115X85X151IN LG STRL

## (undated) DEVICE — 3M™ BAIR HUGGER® UNDERBODY BLANKET, FULL ACCESS, 10 PER CASE 63500: Brand: BAIR HUGGER™

## (undated) DEVICE — GLIDESHEATH SLENDER STAINLESS STEEL KIT: Brand: GLIDESHEATH SLENDER

## (undated) DEVICE — CATH DIAG IMPULSE FL3.5 5F 100CM

## (undated) DEVICE — CATH DIAG IMPULSE FL4 5F 100CM

## (undated) DEVICE — CATH DIAG IMPULSE PIG145 6F 110CM

## (undated) DEVICE — CATH DIAG IMPULSE PIG 5F 100CM

## (undated) DEVICE — TBG INJ CONTRL PVCCLR RIGD RA 1200PSI 10

## (undated) DEVICE — 3M™ TEGADERM™ CHG DRESSING 25/CARTON 4 CARTONS/CASE 1658: Brand: TEGADERM™

## (undated) DEVICE — KT MANIFLD CARDIAC

## (undated) DEVICE — LOU EP: Brand: MEDLINE INDUSTRIES, INC.

## (undated) DEVICE — CVR PROB 96IN LF STRL

## (undated) DEVICE — SOL ISO/ALC RUB 70PCT 4OZ

## (undated) DEVICE — CVR HNDL LT SURG ACCSSRY BLU STRL

## (undated) DEVICE — VEIN SELCT VERT 75CM

## (undated) DEVICE — CATH VENT MIV RADL PIG ST TIP 5F 110CM

## (undated) DEVICE — GW INQWIRE FC PTFE STR .035IN 150